# Patient Record
Sex: FEMALE | Race: WHITE | NOT HISPANIC OR LATINO | Employment: OTHER | URBAN - METROPOLITAN AREA
[De-identification: names, ages, dates, MRNs, and addresses within clinical notes are randomized per-mention and may not be internally consistent; named-entity substitution may affect disease eponyms.]

---

## 2017-01-18 ENCOUNTER — OFFICE VISIT (OUTPATIENT)
Dept: INTERNAL MEDICINE | Facility: CLINIC | Age: 77
End: 2017-01-18
Payer: MEDICARE

## 2017-01-18 VITALS
HEART RATE: 72 BPM | TEMPERATURE: 98 F | OXYGEN SATURATION: 96 % | RESPIRATION RATE: 16 BRPM | BODY MASS INDEX: 25.7 KG/M2 | DIASTOLIC BLOOD PRESSURE: 65 MMHG | HEIGHT: 63 IN | WEIGHT: 145.06 LBS | SYSTOLIC BLOOD PRESSURE: 110 MMHG

## 2017-01-18 DIAGNOSIS — Z01.818 PRE-OPERATIVE CLEARANCE: Primary | ICD-10-CM

## 2017-01-18 PROCEDURE — 99213 OFFICE O/P EST LOW 20 MIN: CPT | Mod: S$GLB,,, | Performed by: INTERNAL MEDICINE

## 2017-01-18 NOTE — MR AVS SNAPSHOT
[facility] Medicine  [address] [place] [address],  [address]  [facility] [address]  Phone: [phone]  Fax: [phone]                  Sarah Rice   2017 10:00 AM   Office Visit    Description:  Female : 1940   Provider:  Govind Jim MD   Department:  [facility] Internal Medicine           Reason for Visit     Follow-up           Diagnoses this Visit        Comments    Pre-operative clearance    -  Primary            To Do List           Your Future Surgeries/Procedures     2017   Surgery with Mark Hunter MD   [facility] (--)    [address] [facility] [address] [phone]              Goals (5 Years of Data)     None      Follow-Up and Disposition     Return in about 3 months (around 2017).    Follow-up and Disposition History      [facility]     [facility] -  Assistance  Registered nurses in the [facility] provide clinical advisement, health education, appointment booking, and other advisory services.  Call for this free service at [phone].             Medications           Message regarding Medications     Verify the changes and/or additions to your medication regime listed below are the same as discussed with your clinician today.  If any of these changes or additions are incorrect, please notify your healthcare provider.             Verify that the below list of medications is an accurate representation of the medications you are currently taking.  If none reported, the list may be blank. If incorrect, please contact your healthcare provider. Carry this list with you in case of emergency.           Current Medications     aspirin (ECOTRIN) 81 MG EC tablet Take 1 tablet (81 mg total) by mouth once daily.    atenolol (TENORMIN) 25 MG tablet Take 1 tablet (25 mg total) by mouth once daily.    diphenhydramine-acetaminophen (TYLENOL PM)  mg Tab Take 1 tablet by  "mouth every evening.    fluocinonide 0.05% (LIDEX) 0.05 % cream Apply topically 2 (two) times daily as needed.    fluticasone (FLONASE) 50 mcg/actuation nasal spray 2 sprays by Each Nare route once daily.    meloxicam (MOBIC) 7.5 MG tablet Take 1 tablet (7.5 mg total) by mouth daily as needed for Pain.    montelukast (SINGULAIR) 10 mg tablet Take 1 tablet (10 mg total) by mouth every evening.    omeprazole (PRILOSEC) 20 MG capsule Take 1 capsule (20 mg total) by mouth once daily.    paroxetine (PAXIL) 10 MG tablet Take 1 tablet (10 mg total) by mouth every morning.           Clinical Reference Information           Vital Signs - Last Recorded  Most recent update: 1/18/2017 10:17 AM by Comfort Morton MA    BP Pulse Temp Resp Ht Wt    110/65 (BP Location: Left arm, Patient Position: Sitting, BP Method: Manual) 72 97.6 °F (36.4 °C) (Oral) 16 5' 3" (1.6 m) 65.8 kg (145 lb 1 oz)    SpO2 BMI             96% 25.7 kg/m2         Blood Pressure          Most Recent Value    BP  110/65      Allergies as of 1/18/2017     Anesthetics - Amide Type    Anesthetics - Linnette Type      Immunizations Administered on Date of Encounter - 1/18/2017     None      [facility] Sign-Up     Activating your [facility] account is as easy as 1-2-3!     1) Visit [email or web address], select Sign Up Now, enter this activation code and your date of birth, then select Next.  [email or web address]  Expires: 2/6/2017 11:55 AM      2) Create a username and password to use when you visit [facility] in the future and select a security question in case you lose your password and select Next.    3) Enter your e-mail address and click Sign Up!    Additional Information  If you have questions, please e-mail [email or web address] or call [phone] to talk to our [facility] staff. Remember, [facility] is NOT to be used for urgent needs. For medical emergencies, dial 911.         "

## 2017-01-18 NOTE — PROGRESS NOTES
"Subjective:      Patient ID: Sraah Rice is a 76 y.o. female.    Chief Complaint: Follow-up    HPI: Here for medical clearance for the other eye.  Asymptomatic.  Happy about outcome from previous surgery.    Review of Systems   All other systems reviewed and are negative.      Objective:     Visit Vitals    /65 (BP Location: Left arm, Patient Position: Sitting, BP Method: Manual)    Pulse 72    Temp 97.6 °F (36.4 °C) (Oral)    Resp 16    Ht 5' 3" (1.6 m)    Wt 65.8 kg (145 lb 1 oz)    SpO2 96%    BMI 25.7 kg/m2       Physical Exam   Constitutional: She is oriented to person, place, and time. She appears well-developed and well-nourished.   HENT:   Head: Normocephalic and atraumatic.   Right Ear: External ear normal.   Left Ear: External ear normal.   Nose: Nose normal.   Mouth/Throat: Oropharynx is clear and moist.   Eyes: Conjunctivae and EOM are normal. Pupils are equal, round, and reactive to light.   Neck: Normal range of motion. Neck supple.   Cardiovascular: Normal rate, regular rhythm and normal heart sounds.    Pulmonary/Chest: Effort normal and breath sounds normal.   Abdominal: Soft. Bowel sounds are normal.   Musculoskeletal: Normal range of motion.   Neurological: She is alert and oriented to person, place, and time.   Skin: Skin is warm and dry.   Psychiatric: She has a normal mood and affect. Her behavior is normal. Judgment and thought content normal.   Nursing note and vitals reviewed.      Assessment:     1. Pre-operative clearance    Has done well.  Plan:     Pre-operative clearance    Medical cleared.  "

## 2017-01-19 PROBLEM — H25.019 CATARACT CORTICAL, SENILE: Status: ACTIVE | Noted: 2017-01-19

## 2017-01-19 PROBLEM — H25.019 CATARACT CORTICAL, SENILE: Status: RESOLVED | Noted: 2017-01-19 | Resolved: 2017-01-19

## 2017-02-21 ENCOUNTER — TELEPHONE (OUTPATIENT)
Dept: VASCULAR SURGERY | Facility: CLINIC | Age: 77
End: 2017-02-21

## 2017-02-21 DIAGNOSIS — I71.20 THORACIC AORTIC ANEURYSM WITHOUT RUPTURE: Primary | ICD-10-CM

## 2017-02-21 NOTE — TELEPHONE ENCOUNTER
----- Message from Arun Ospina sent at 2/21/2017 10:24 AM CST -----  Contact: Luz with Dr. 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 Garfield said to call pt's daughter Emelina at [phone] to schedule consult. If you have any questions please call Luz at [phone]

## 2017-02-21 NOTE — TELEPHONE ENCOUNTER
Left message for the pt daughter to call the clinic. I will schedule the appt once I confirm appt times.

## 2017-03-07 ENCOUNTER — HOSPITAL ENCOUNTER (OUTPATIENT)
Dept: RADIOLOGY | Facility: HOSPITAL | Age: 77
Discharge: HOME | End: 2017-03-07
Attending: SURGERY
Payer: MEDICARE

## 2017-03-07 ENCOUNTER — OFFICE VISIT (OUTPATIENT)
Dept: VASCULAR SURGERY | Facility: CLINIC | Age: 77
End: 2017-03-07
Payer: MEDICARE

## 2017-03-07 VITALS
TEMPERATURE: 99 F | WEIGHT: 145.31 LBS | BODY MASS INDEX: 24.81 KG/M2 | SYSTOLIC BLOOD PRESSURE: 136 MMHG | DIASTOLIC BLOOD PRESSURE: 77 MMHG | HEIGHT: 64 IN | HEART RATE: 74 BPM

## 2017-03-07 DIAGNOSIS — Z86.79 S/P AAA (ABDOMINAL AORTIC ANEURYSM) REPAIR: Primary | ICD-10-CM

## 2017-03-07 DIAGNOSIS — Z98.890 S/P AAA (ABDOMINAL AORTIC ANEURYSM) REPAIR: Primary | ICD-10-CM

## 2017-03-07 DIAGNOSIS — I71.20 THORACIC AORTIC ANEURYSM WITHOUT RUPTURE: ICD-10-CM

## 2017-03-07 LAB
CREAT SERPL-MCNC: 0.9 MG/DL (ref 0.5–1.4)
SAMPLE: NORMAL

## 2017-03-07 PROCEDURE — 99213 OFFICE O/P EST LOW 20 MIN: CPT | Mod: PBBFAC | Performed by: SURGERY

## 2017-03-07 PROCEDURE — 74174 CTA ABD&PLVS W/CONTRAST: CPT | Mod: 26,GC,, | Performed by: RADIOLOGY

## 2017-03-07 PROCEDURE — 71275 CT ANGIOGRAPHY CHEST: CPT | Mod: 26,GC,, | Performed by: RADIOLOGY

## 2017-03-07 PROCEDURE — 99999 PR PBB SHADOW E&M-EST. PATIENT-LVL III: CPT | Mod: PBBFAC,,, | Performed by: SURGERY

## 2017-03-07 PROCEDURE — 99213 OFFICE O/P EST LOW 20 MIN: CPT | Mod: S$PBB,,, | Performed by: SURGERY

## 2017-03-07 RX ORDER — ASPIRIN AND OMEPRAZOLE 325; 40 MG/1; MG/1
TABLET, FILM COATED ORAL DAILY
COMMUNITY
End: 2017-09-18

## 2017-03-07 RX ORDER — LISINOPRIL 5 MG/1
5 TABLET ORAL DAILY
COMMUNITY
End: 2017-09-18 | Stop reason: SDUPTHER

## 2017-03-07 RX ADMIN — IOHEXOL 100 ML: 350 INJECTION, SOLUTION INTRAVENOUS at 10:03

## 2017-03-07 NOTE — PROGRESS NOTES
See my prior note; review of systems, family history and social history are   unchanged.    A 76-year-old female status post:  1.  Endovascular repair, thoracic aortic aneurysm (TEVAR) 03/28/2011.  2.  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    She has been lost to follow up and has not been seen in over three years.  She   now returns.  She denies any abdominal or back pain.    Predominantly outside scan suggests an endoleak.    PHYSICAL EXAMINATION:  VITAL SIGNS:  See nursing note.  ABDOMEN:  Soft and nontender.  EXTREMITIES:  Femoral pulses are 2+.    IMAGING:  CT scan from today was personally reviewed and demonstrates:  1. Complete involution of thoracic aneurysm with no endoleak.  2. Right common iliac aneurysm sac of 3.1 compared with 3.2 from three years   ago.  I do not appreciate a clear endoleak.    Of note, the distal seal on the right common iliac is only 1 cm and very   calcific.  This was done to preserve the right hypogastric since the left was   embolized at the time of this procedure.    A 2.2 cm left common iliac aneurysm sac, which is stable from prior.    ASSESSMENT:  Five-year followup, stage TEVAR and bilateral iliac aneurysm   repair.    The right common iliac aneurysm sac is stable in size.  There is a short distal   seal zone that I disagree with decision this has a distal endoleak.  Given the   stability of the aneurysm sac, I would recommend continued observation rather   than extending this.    PLAN:  Follow up in one year with an aortic ultrasound.      JAZZ  dd: 03/07/2017 11:45:43 (CST)  td: 03/08/2017 05:31:51 (CST)  Doc ID   #[id number]  Job ID #[id number]    CC:

## 2017-03-07 NOTE — MR AVS SNAPSHOT
[facility]  [address]  [facility] [address]  Phone: [phone]  Fax: [phone]                  Sarah Rice   3/7/2017 10:30 AM   Office Visit    Description:  Female : 1940   Provider:  VAN Segal III, MD   Department:  [facility]           Reason for Visit     Follow-up           Diagnoses this Visit        Comments    S/P AAA (abdominal aortic aneurysm) repair    -  Primary            To Do List           Goals (5 Years of Data)     None      Follow-Up and Disposition     Return in about 1 year (around 3/7/2018).      [facility]     [facility] On Call Nurse Care Line -  Assistance  Registered nurses in the [facility] On Call Center provide clinical advisement, health education, appointment booking, and other advisory services.  Call for this free service at [phone].             Medications           Message regarding Medications     Verify the changes and/or additions to your medication regime listed below are the same as discussed with your clinician today.  If any of these changes or additions are incorrect, please notify your healthcare provider.             Verify that the below list of medications is an accurate representation of the medications you are currently taking.  If none reported, the list may be blank. If incorrect, please contact your healthcare provider. Carry this list with you in case of emergency.           Current Medications     aspirin (ECOTRIN) 81 MG EC tablet Take 1 tablet (81 mg total) by mouth once daily.    aspirin-omeprazole (YOSPRALA) 325-40 mg TbID Take by mouth once daily. Take one daily / once a day    atenolol (TENORMIN) 25 MG tablet Take 1 tablet (25 mg total) by mouth once daily.    diphenhydramine-acetaminophen (TYLENOL PM)  mg Tab Take 1 tablet by mouth every evening.    fluocinonide 0.05% (LIDEX) 0.05 % cream Apply topically 2 (two) times daily as needed.    fluticasone (FLONASE)  "50 mcg/actuation nasal spray 2 sprays by Each Nare route once daily.    lisinopril (PRINIVIL,ZESTRIL) 5 MG tablet Take 5 mg by mouth once daily.    meloxicam (MOBIC) 7.5 MG tablet Take 1 tablet (7.5 mg total) by mouth daily as needed for Pain.    montelukast (SINGULAIR) 10 mg tablet Take 1 tablet (10 mg total) by mouth every evening.    omeprazole (PRILOSEC) 20 MG capsule Take 1 capsule (20 mg total) by mouth once daily.    paroxetine (PAXIL) 10 MG tablet Take 1 tablet (10 mg total) by mouth every morning.           Clinical Reference Information           Your Vitals Were     BP Pulse Temp Height Weight BMI    136/77 74 98.5 °F (36.9 °C) (Oral) 5' 4" (1.626 m) 65.9 kg (145 lb 4.8 oz) 24.94 kg/m2      Blood Pressure          Most Recent Value    Right Arm BP - Sitting  136/77    Left Arm BP - Sitting  132/77    BP  136/77      Allergies as of 3/7/2017     Anesthetics - Amide Type    Anesthetics - Linnette Type      Immunizations Administered on Date of Encounter - 3/7/2017     None      Orders Placed During Today's Visit     Future Labs/Procedures Expected by Expires    VAS US Abdominal Aorta  As directed 3/7/2019      [facility] Sign-Up     Activating your [facility] account is as easy as 1-2-3!     1) Visit [email or web address], select Sign Up Now, enter this activation code and your date of birth, then select Next.  [email or web address]  Expires: 4/21/2017 11:47 AM      2) Create a username and password to use when you visit [facility] in the future and select a security question in case you lose your password and select Next.    3) Enter your e-mail address and click Sign Up!    Additional Information  If you have questions, please e-mail [email or web address] or call [phone] to talk to our [facility] staff. Remember, [facility] is NOT to be used for urgent needs. For medical emergencies, dial 911.         Language Assistance Services     ATTENTION: Language assistance services are available, free of charge. Please call " [phone].      ATENCIÓN: Si habla español, tiene a bustamante disposición servicios gratuitos de asistencia lingüística. Llame al [phone].     CHÚ Ý: N?u b?n nói Ti?ng Vi?t, có các d?ch v? h? tr? ngôn ng? mi?n phí dành cho b?n. G?i s? [phone].         [facility] complies with applicable Federal civil rights laws and does not discriminate on the basis of race, color, national origin, age, disability, or sex.

## 2017-04-06 ENCOUNTER — INITIAL CONSULT (OUTPATIENT)
Dept: PODIATRY | Facility: CLINIC | Age: 77
End: 2017-04-06
Payer: MEDICARE

## 2017-04-06 VITALS
BODY MASS INDEX: 24.41 KG/M2 | HEIGHT: 64 IN | WEIGHT: 143 LBS | DIASTOLIC BLOOD PRESSURE: 63 MMHG | HEART RATE: 63 BPM | SYSTOLIC BLOOD PRESSURE: 104 MMHG | RESPIRATION RATE: 18 BRPM

## 2017-04-06 DIAGNOSIS — L60.0 INGROWN NAIL: Primary | ICD-10-CM

## 2017-04-06 PROCEDURE — 99203 OFFICE O/P NEW LOW 30 MIN: CPT | Mod: S$GLB,,, | Performed by: PODIATRIST

## 2017-04-06 NOTE — PROGRESS NOTES
"Subjective:    Patient ID: Sarah Rice is a 76 y.o. female.    Chief Complaint: Ingrown Toenail      HPI:   Sarah is a 76 y.o. female who presents to the clinic complaining of painful ingrown toenail on the left foot.  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     Social History Main Topics    Smoking status: Former Smoker     Packs/day: 0.50     Years: 14.00     Quit date: 4/21/1999    Smokeless tobacco: Never Used    Alcohol use No    Drug use: No    Sexual activity: Not on file     Other Topics Concern    Not on file     Social History Narrative         Current Outpatient Prescriptions:     aspirin-omeprazole (YOSPRALA) 325-40 mg TbID, Take by mouth once daily. Take one daily / once a day, Disp: , Rfl:     atenolol (TENORMIN) 25 MG tablet, Take 1 tablet (25 mg total) by mouth once daily., Disp: 90 tablet, Rfl: 3    diphenhydramine-acetaminophen (TYLENOL PM)  mg Tab, Take 1 tablet by mouth every evening., Disp: , Rfl:     fluocinonide 0.05% (LIDEX) 0.05 % cream, Apply topically 2 (two) times daily as needed., Disp: 180 g, Rfl: 3    fluticasone (FLONASE) " "50 mcg/actuation nasal spray, 2 sprays by Each Nare route once daily., Disp: 3 Bottle, Rfl: 3    lisinopril (PRINIVIL,ZESTRIL) 5 MG tablet, Take 5 mg by mouth once daily., Disp: , Rfl:     meloxicam (MOBIC) 7.5 MG tablet, Take 1 tablet (7.5 mg total) by mouth daily as needed for Pain., Disp: 90 tablet, Rfl: 3    montelukast (SINGULAIR) 10 mg tablet, Take 1 tablet (10 mg total) by mouth every evening., Disp: 90 tablet, Rfl: 3    omeprazole (PRILOSEC) 20 MG capsule, Take 1 capsule (20 mg total) by mouth once daily., Disp: 90 capsule, Rfl: 3    paroxetine (PAXIL) 10 MG tablet, Take 1 tablet (10 mg total) by mouth every morning., Disp: 90 tablet, Rfl: 3    aspirin (ECOTRIN) 81 MG EC tablet, Take 1 tablet (81 mg total) by mouth once daily. (Patient taking differently: Take 81 mg by mouth once daily. Take every now and then), Disp: 100 tablet, Rfl: 3  Review of patient's allergies indicates:   Allergen Reactions    Anesthetics - amide type      Patient unsure of med - unable to be awakened.    Patient has tolerated topical local anesthetic for cataract procedure without issues    Anesthetics - jordan type      Patient unsure of med - unable to be awakened.    Patient has tolerated topical local anesthetic for cataract procedure without issues       /63 (BP Location: Left arm, Patient Position: Sitting, BP Method: Automatic)  Pulse 63  Resp 18  Ht 5' 4" (1.626 m)  Wt 64.9 kg (143 lb)  BMI 24.55 kg/m2    ROS  ROS Constitutional:  General Appearance: well nourished  Vascular: negative for cramps, edema and bruising  Musculoskeletal: negative for joint paint and joint edema  Skin: negative for rashes and lesions  Neurological: negative for burning, tingling and numbness  Gastrointestinal: negative for stomach pain, nausea and vomiting        Objective:        Ortho/SPM Exam  Physical Exam  LE exam con't:  V: DP 2/4, PT 2/4, CRT< 3s to all digits tested.     N: SILT in SP/DP/T/Chucho/Saph distributions. "     Derm: Skin intact. No erythema, edema or ecchymosis. L 3rd toenail mildly ingrowning. No signs of infection    Ortho:  +Motor EHL/FHL/TA/GA   Compartments soft/compressible. No pain on passive stretch of big toe. No calf  pain.        Assessment:     Imaging / Labs:    No results found.            1. Ingrown nail - Left Foot          Plan:            Sarah was seen today for ingrown toenail.    Diagnoses and all orders for this visit:    Ingrown nail - Left Foot        Sarah Rice is a 76 y.o. female presenting w/   1. Ingrown nail - Left Foot          --pt seen, evaluated, and managed  -dx discussed in detail. All questions/concerns addressed  -all tx options discussed. All alternatives, risks, benefits of all txs discussed  -The patient was educated regarding the above diagnosis. We discussed conservative care options regarding shoe wear and/or padding.  -rxs dispensed: none  -discussed ingrowing toenails and all tx options. Pt opts for non-procedural slantback which was performed as a courtesy w/o complication  -pt to perform epsom salt or betadine soaks once daily x 2wks. Written instructions dispensed  -keep toe covered with triple abx ointment + bandaid x 2wks  -will plan for procedural removal at nxt visit or if ingrown nails recur    rtc 4 wks for possible procedure: PNA w/o matrixectomy b/l hallux medial border      Return in about 4 weeks (around 5/4/2017) for procedure: ingrown toenail removal.

## 2017-04-06 NOTE — PATIENT INSTRUCTIONS
Instructions for Care after Ingrown Nail removal      Dressing Options- Traditional Method:  1. Soaking two times a day in WARM water with Epsom salts or diluted Povidone Iodine  (Betadine) for 15-20 minutes. You will need to purchase these products from the pharmacy.  2. Dry toe then apply an antibiotic cream or ointment such as Neosporin or Polysporin plus or  Garamycin and cover with a 2 x 2 inch size gauze and then secure with a 1 inch band aid.  3. In the second week, take the dressing off at bedtime to air dry the toe.          Understanding Ingrown Toenails    An ingrown nail is the result of a nail growing into the skin that surrounds it. This often occurs at either edge of the big toe. Ingrown nails may be caused by improper trimming, inherited nail deformities, injuries, fungal infections, or pressure.  Symptoms  Ingrown nails may cause pain at the tip of the toe or all the way to the base of the toe. The pain is often worse while walking. An ingrown nail may also lead to infection, inflammation, or a more serious condition. If its infected, you might see pus or redness.  Evaluation  To determine the extent of your problem, your healthcare provider examines and possibly presses the painful area. If other problems are suspected, blood tests, cultures, and X-rays may be done as well.  Treatment  If the nail isnt infected, your healthcare provider may trim the corner of it to help relieve your symptoms. He or she may need to remove one side of your nail back to the cuticle. The base of the nail may then be treated with a chemical to keep the ingrown part from growing back. Severe infections or ingrown nails may require antibiotics and temporary or permanent removal of a portion of the nail. To prevent pain, a local anesthetic may be used in these procedures. This treatment is usually done at your healthcare provider's office.  Prevention  Many nail problems can be prevented by wearing the right shoes and  trimming your nails properly. To help avoid infection, keep your feet clean and dry. If you have diabetes, talk with your healthcare provider before doing any foot self-care.  · The right shoes: Get your feet measured (your size may change as you age). Wear shoes that are supportive and roomy enough for your toes to wiggle. Look for shoes made of natural materials such as leather, which allow your feet to breathe.  · Proper trimming: To avoid problems, trim your toenails straight across without cutting down into the corners. If you cant trim your own nails, ask your healthcare provider to do so for you.  Date Last Reviewed: 10/1/2016  © 5964-0477 [employer]. [address] [place] [address], [place] [address]. All rights reserved. This information is not intended as a substitute for professional medical care. Always follow your healthcare professional's instructions.

## 2017-04-06 NOTE — MR AVS SNAPSHOT
[facility]  [address], [address]   [place] [address]  Phone: [phone]  Fax: [phone]                  Sarah Rice   2017 1:45 PM   Initial consult    Description:  Female : 1940   Provider:  Vincent Richards Jr., DPM   Department:  [facility]           Reason for Visit     Ingrown Toenail           Diagnoses this Visit        Comments    Ingrown nail    -  Primary            To Do List           Future Appointments        Provider Department Dept Phone    2017 10:45 AM Vincent Richards Jr., DPM [facility] [phone]      Goals (5 Years of Data)     None      Follow-Up and Disposition     Return in about 4 weeks (around 2017) for procedure: ingrown toenail removal.      [facility]     [facility] On Call Nurse Care Line -  Assistance  Unless otherwise directed by your provider, please contact [facility], our nurse care line that is available for  assistance.     Registered nurses in the [facility] On Call Center provide: appointment scheduling, clinical advisement, health education, and other advisory services.  Call: [phone] (toll free)               Medications           Message regarding Medications     Verify the changes and/or additions to your medication regime listed below are the same as discussed with your clinician today.  If any of these changes or additions are incorrect, please notify your healthcare provider.             Verify that the below list of medications is an accurate representation of the medications you are currently taking.  If none reported, the list may be blank. If incorrect, please contact your healthcare provider. Carry this list with you in case of emergency.           Current Medications     aspirin-omeprazole (YOSPRALA) 325-40 mg TbID Take by mouth once daily. Take one daily / once a day    atenolol (TENORMIN) 25 MG tablet Take 1 tablet (25 mg total) by  "mouth once daily.    diphenhydramine-acetaminophen (TYLENOL PM)  mg Tab Take 1 tablet by mouth every evening.    fluocinonide 0.05% (LIDEX) 0.05 % cream Apply topically 2 (two) times daily as needed.    fluticasone (FLONASE) 50 mcg/actuation nasal spray 2 sprays by Each Nare route once daily.    lisinopril (PRINIVIL,ZESTRIL) 5 MG tablet Take 5 mg by mouth once daily.    meloxicam (MOBIC) 7.5 MG tablet Take 1 tablet (7.5 mg total) by mouth daily as needed for Pain.    montelukast (SINGULAIR) 10 mg tablet Take 1 tablet (10 mg total) by mouth every evening.    omeprazole (PRILOSEC) 20 MG capsule Take 1 capsule (20 mg total) by mouth once daily.    paroxetine (PAXIL) 10 MG tablet Take 1 tablet (10 mg total) by mouth every morning.    aspirin (ECOTRIN) 81 MG EC tablet Take 1 tablet (81 mg total) by mouth once daily.           Clinical Reference Information           Your Vitals Were     BP Pulse Resp Height Weight BMI    104/63 (BP Location: Left arm, Patient Position: Sitting, BP Method: Automatic) 63 18 5' 4" (1.626 m) 64.9 kg (143 lb) 24.55 kg/m2      Blood Pressure          Most Recent Value    BP  104/63      Allergies as of 4/6/2017     Anesthetics - Amide Type    Anesthetics - Linnette Type      Immunizations Administered on Date of Encounter - 4/6/2017     None      [facility] Sign-Up     Activating your [facility] account is as easy as 1-2-3!     1) Visit [email or web address], select Sign Up Now, enter this activation code and your date of birth, then select Next.  [email or web address]  Expires: 4/21/2017 12:47 PM      2) Create a username and password to use when you visit [facility] in the future and select a security question in case you lose your password and select Next.    3) Enter your e-mail address and click Sign Up!    Additional Information  If you have questions, please e-mail [email or web address] or call [phone] to talk to our [facility] staff. Remember, [facility] is NOT to be used for urgent needs. For " medical emergencies, dial 911.         Instructions      Instructions for Care after Ingrown Nail removal      Dressing Options- Traditional Method:  1. Soaking two times a day in WARM water with Epsom salts or diluted Povidone Iodine  (Betadine) for 15-20 minutes. You will need to purchase these products from the pharmacy.  2. Dry toe then apply an antibiotic cream or ointment such as Neosporin or Polysporin plus or  Garamycin and cover with a 2 x 2 inch size gauze and then secure with a 1 inch band aid.  3. In the second week, take the dressing off at bedtime to air dry the toe.          Understanding Ingrown Toenails    An ingrown nail is the result of a nail growing into the skin that surrounds it. This often occurs at either edge of the big toe. Ingrown nails may be caused by improper trimming, inherited nail deformities, injuries, fungal infections, or pressure.  Symptoms  Ingrown nails may cause pain at the tip of the toe or all the way to the base of the toe. The pain is often worse while walking. An ingrown nail may also lead to infection, inflammation, or a more serious condition. If its infected, you might see pus or redness.  Evaluation  To determine the extent of your problem, your healthcare provider examines and possibly presses the painful area. If other problems are suspected, blood tests, cultures, and X-rays may be done as well.  Treatment  If the nail isnt infected, your healthcare provider may trim the corner of it to help relieve your symptoms. He or she may need to remove one side of your nail back to the cuticle. The base of the nail may then be treated with a chemical to keep the ingrown part from growing back. Severe infections or ingrown nails may require antibiotics and temporary or permanent removal of a portion of the nail. To prevent pain, a local anesthetic may be used in these procedures. This treatment is usually done at your healthcare provider's office.  Prevention  Many nail  problems can be prevented by wearing the right shoes and trimming your nails properly. To help avoid infection, keep your feet clean and dry. If you have diabetes, talk with your healthcare provider before doing any foot self-care.  · The right shoes: Get your feet measured (your size may change as you age). Wear shoes that are supportive and roomy enough for your toes to wiggle. Look for shoes made of natural materials such as leather, which allow your feet to breathe.  · Proper trimming: To avoid problems, trim your toenails straight across without cutting down into the corners. If you cant trim your own nails, ask your healthcare provider to do so for you.  Date Last Reviewed: 10/1/2016  © 9600-4356 [employer]. [address] [place] [address], [place] [address]. All rights reserved. This information is not intended as a substitute for professional medical care. Always follow your healthcare professional's instructions.             Language Assistance Services     ATTENTION: Language assistance services are available, free of charge. Please call [phone].      ATENCIÓN: Si habla español, tiene a bustamante disposición servicios gratuitos de asistencia lingüística. Llame al [phone].     [facility] Ý: N?u b?n nói Ti?ng Vi?t, có các d?ch v? h? tr? ngôn ng? mi?n phí dành cho b?n. G?i s? [phone].         [facility] complies with applicable Federal civil rights laws and does not discriminate on the basis of race, color, national origin, age, disability, or sex.

## 2017-04-06 NOTE — LETTER
April 10, 2017      Alfonso Conner MD  [address]  [address]  [facility] [address]           [facility]  [address], [address]   [facility] [address]  Phone: [phone]  Fax: [phone]          Patient: Sarah Rice   MR Number: [id number]   YOB: 1940   Date of Visit: 4/6/2017       Dear Dr. Alfonso Conner:    Thank you for referring Sarah Rice to me for evaluation. Attached you will find relevant portions of my assessment and plan of care.    If you have questions, please do not hesitate to call me. I look forward to following Sarah Rice along with you.    Sincerely,    Vincent Richards Jr., DPM    Enclosure  CC:  No Recipients    If you would like to receive this communication electronically, please contact [email or web address] or [phone] to request more information on [employer] Link access.    For providers and/or their staff who would like to refer a patient to [facility], please contact us through our one-stop-shop provider referral line, [facility] [place], at [phone].    If you feel you have received this communication in error or would no longer like to receive these types of communications, please e-mail [email or web address]

## 2017-05-09 ENCOUNTER — OFFICE VISIT (OUTPATIENT)
Dept: PODIATRY | Facility: CLINIC | Age: 77
End: 2017-05-09
Payer: MEDICARE

## 2017-05-09 VITALS
DIASTOLIC BLOOD PRESSURE: 71 MMHG | HEART RATE: 73 BPM | RESPIRATION RATE: 18 BRPM | BODY MASS INDEX: 23.9 KG/M2 | WEIGHT: 140 LBS | HEIGHT: 64 IN | SYSTOLIC BLOOD PRESSURE: 106 MMHG

## 2017-05-09 DIAGNOSIS — L60.0 INGROWN NAIL: Primary | ICD-10-CM

## 2017-05-09 PROCEDURE — 99213 OFFICE O/P EST LOW 20 MIN: CPT | Mod: S$GLB,,, | Performed by: PODIATRIST

## 2017-05-09 NOTE — MR AVS SNAPSHOT
[facility]  [address], [address]   [place] [address]  Phone: [phone]  Fax: [phone]                  Sarah Rice   2017 10:45 AM   Office Visit    Description:  Female : 1940   Provider:  Vincent Richards Jr., DPM   Department:  [facility]           Reason for Visit     Follow-up           Diagnoses this Visit        Comments    Ingrown nail    -  Primary            To Do List           Goals (5 Years of Data)     None      Follow-Up and Disposition     Return if symptoms worsen or fail to improve.    Follow-up and Disposition History      [facility]     [facility] On Call Nurse Care Line -  Assistance  Unless otherwise directed by your provider, please contact [facility], our nurse care line that is available for  assistance.     Registered nurses in the [facility] provide: appointment scheduling, clinical advisement, health education, and other advisory services.  Call: [phone] (toll free)               Medications           Message regarding Medications     Verify the changes and/or additions to your medication regime listed below are the same as discussed with your clinician today.  If any of these changes or additions are incorrect, please notify your healthcare provider.        STOP taking these medications     aspirin (ECOTRIN) 81 MG EC tablet Take 1 tablet (81 mg total) by mouth once daily.           Verify that the below list of medications is an accurate representation of the medications you are currently taking.  If none reported, the list may be blank. If incorrect, please contact your healthcare provider. Carry this list with you in case of emergency.           Current Medications     aspirin-omeprazole (YOSPRALA) 325-40 mg TbID Take by mouth once daily. Take one daily / once a day    atenolol (TENORMIN) 25 MG tablet Take 1 tablet (25 mg total) by mouth once daily.     "diphenhydramine-acetaminophen (TYLENOL PM)  mg Tab Take 1 tablet by mouth every evening.    fluocinonide 0.05% (LIDEX) 0.05 % cream Apply topically 2 (two) times daily as needed.    fluticasone (FLONASE) 50 mcg/actuation nasal spray 2 sprays by Each Nare route once daily.    lisinopril (PRINIVIL,ZESTRIL) 5 MG tablet Take 5 mg by mouth once daily.    meloxicam (MOBIC) 7.5 MG tablet Take 1 tablet (7.5 mg total) by mouth daily as needed for Pain.    montelukast (SINGULAIR) 10 mg tablet Take 1 tablet (10 mg total) by mouth every evening.    omeprazole (PRILOSEC) 20 MG capsule Take 1 capsule (20 mg total) by mouth once daily.    paroxetine (PAXIL) 10 MG tablet Take 1 tablet (10 mg total) by mouth every morning.           Clinical Reference Information           Your Vitals Were     BP Pulse Resp Height Weight BMI    106/71 (BP Location: Right arm, Patient Position: Sitting, BP Method: Automatic) 73 18 5' 4" (1.626 m) 63.5 kg (140 lb) 24.03 kg/m2      Blood Pressure          Most Recent Value    BP  106/71      Allergies as of 5/9/2017     Anesthetics - Amide Type    Anesthetics - Linnette Type- Parabens      Immunizations Administered on Date of Encounter - 5/9/2017     None      [facility] Sign-Up     Activating your [facility] account is as easy as 1-2-3!     1) Visit [email or web address], select Sign Up Now, enter this activation code and your date of birth, then select Next.  [email or web address]  Expires: 6/23/2017 11:06 AM      2) Create a username and password to use when you visit [facility] in the future and select a security question in case you lose your password and select Next.    3) Enter your e-mail address and click Sign Up!    Additional Information  If you have questions, please e-mail [email or web address] or call [phone] to talk to our [facility] staff. Remember, [facility] is NOT to be used for urgent needs. For medical emergencies, dial 911.         Instructions      Understanding Ingrown Toenails    An " ingrown nail is the result of a nail growing into the skin that surrounds it. This often occurs at either edge of the big toe. Ingrown nails may be caused by improper trimming, inherited nail deformities, injuries, fungal infections, or pressure.  Symptoms  Ingrown nails may cause pain at the tip of the toe or all the way to the base of the toe. The pain is often worse while walking. An ingrown nail may also lead to infection, inflammation, or a more serious condition. If its infected, you might see pus or redness.  Evaluation  To determine the extent of your problem, your healthcare provider examines and possibly presses the painful area. If other problems are suspected, blood tests, cultures, and X-rays may be done as well.  Treatment  If the nail isnt infected, your healthcare provider may trim the corner of it to help relieve your symptoms. He or she may need to remove one side of your nail back to the cuticle. The base of the nail may then be treated with a chemical to keep the ingrown part from growing back. Severe infections or ingrown nails may require antibiotics and temporary or permanent removal of a portion of the nail. To prevent pain, a local anesthetic may be used in these procedures. This treatment is usually done at your healthcare provider's office.  Prevention  Many nail problems can be prevented by wearing the right shoes and trimming your nails properly. To help avoid infection, keep your feet clean and dry. If you have diabetes, talk with your healthcare provider before doing any foot self-care.  · The right shoes: Get your feet measured (your size may change as you age). Wear shoes that are supportive and roomy enough for your toes to wiggle. Look for shoes made of natural materials such as leather, which allow your feet to breathe.  · Proper trimming: To avoid problems, trim your toenails straight across without cutting down into the corners. If you cant trim your own nails, ask your  healthcare provider to do so for you.  Date Last Reviewed: 10/1/2016  © 7841-8946 [employer]. [address] [place] [address], [place] [address]. All rights reserved. This information is not intended as a substitute for professional medical care. Always follow your healthcare professional's instructions.             Language Assistance Services     ATTENTION: Language assistance services are available, free of charge. Please call [phone].      ATENCIÓN: Si habla español, tiene a bustamante disposición servicios gratuitos de asistencia lingüística. Llame al [phone].     [facility] Ý: N?u b?n nói Ti?ng Vi?t, có các d?ch v? h? tr? ngôn ng? mi?n phí dành cho b?n. G?i s? [phone].         [facility] complies with applicable Federal civil rights laws and does not discriminate on the basis of race, color, national origin, age, disability, or sex.

## 2017-05-09 NOTE — PATIENT INSTRUCTIONS

## 2017-05-09 NOTE — PROGRESS NOTES
"Subjective:    Patient ID: Sarah Rice is a 76 y.o. female.    Chief Complaint: Follow-up (post toenail removal)      HPI:   Sarah is a 76 y.o. female who presents to the clinic complaining of painful ingrown toenail on the left foot.  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     Social History Main Topics    Smoking status: Former Smoker     Packs/day: 0.50     Years: 14.00     Quit date: 4/21/1999    Smokeless tobacco: Never Used    Alcohol use No    Drug use: No    Sexual activity: Not on file     Other Topics Concern    Not on file     Social History Narrative         Current Outpatient Prescriptions:     aspirin-omeprazole (YOSPRALA) 325-40 mg TbID, Take by mouth once daily. Take one daily / once a day, Disp: , Rfl:     atenolol (TENORMIN) 25 MG tablet, Take 1 tablet (25 mg total) by mouth once daily., Disp: 90 tablet, Rfl: 3    diphenhydramine-acetaminophen (TYLENOL PM)  mg Tab, Take 1 tablet by mouth every evening., Disp: , Rfl:     fluocinonide 0.05% (LIDEX) 0.05 % cream, Apply topically 2 (two) times daily as needed., Disp: 180 g, Rfl: 3    " "fluticasone (FLONASE) 50 mcg/actuation nasal spray, 2 sprays by Each Nare route once daily., Disp: 3 Bottle, Rfl: 3    lisinopril (PRINIVIL,ZESTRIL) 5 MG tablet, Take 5 mg by mouth once daily., Disp: , Rfl:     meloxicam (MOBIC) 7.5 MG tablet, Take 1 tablet (7.5 mg total) by mouth daily as needed for Pain., Disp: 90 tablet, Rfl: 3    montelukast (SINGULAIR) 10 mg tablet, Take 1 tablet (10 mg total) by mouth every evening., Disp: 90 tablet, Rfl: 3    omeprazole (PRILOSEC) 20 MG capsule, Take 1 capsule (20 mg total) by mouth once daily., Disp: 90 capsule, Rfl: 3    paroxetine (PAXIL) 10 MG tablet, Take 1 tablet (10 mg total) by mouth every morning., Disp: 90 tablet, Rfl: 3  Review of patient's allergies indicates:   Allergen Reactions    Anesthetics - amide type      Patient unsure of med - unable to be awakened.    Patient has tolerated topical local anesthetic for cataract procedure without issues    Anesthetics - jordan type      Patient unsure of med - unable to be awakened.    Patient has tolerated topical local anesthetic for cataract procedure without issues       /71 (BP Location: Right arm, Patient Position: Sitting, BP Method: Automatic)  Pulse 73  Resp 18  Ht 5' 4" (1.626 m)  Wt 63.5 kg (140 lb)  BMI 24.03 kg/m2    ROS  ROS Constitutional:  General Appearance: well nourished  Vascular: negative for cramps, edema and bruising  Musculoskeletal: negative for joint paint and joint edema  Skin: negative for rashes and lesions  Neurological: negative for burning, tingling and numbness  Gastrointestinal: negative for stomach pain, nausea and vomiting        Objective:        Ortho/SPM Exam  Physical Exam  LE exam con't:  V: DP 2/4, PT 2/4, CRT< 3s to all digits tested.     N: SILT in SP/DP/T/Chucho/Saph distributions.     Derm: Skin intact. No erythema, edema or ecchymosis. L 3rd toenail w/o signs of infection or ingrowing nail.     Ortho:  +Motor EHL/FHL/TA/GA   Compartments soft/compressible. No " pain on passive stretch of big toe. No calf  pain.        Assessment:     Imaging / Labs:    No results found.            1. Ingrown nail          Plan:            Sarah was seen today for follow-up.    Diagnoses and all orders for this visit:    Ingrown nail        Sarah Rice is a 76 y.o. female presenting w/   1. Ingrown nail          --pt seen, evaluated, and managed  -dx discussed in detail. All questions/concerns addressed  -all tx options discussed. All alternatives, risks, benefits of all txs discussed  -The patient was educated regarding the above diagnosis. We discussed conservative care options regarding shoe wear and/or padding.  -rxs dispensed: none  -pt has healed well after slantback  -will plan for procedural removal at nxt visit or if ingrown nails recur      Return if symptoms worsen or fail to improve.

## 2017-05-09 NOTE — LETTER
May 9, 2017      Alfonso Conner MD  [address]  [address]  [facility] [address]           [facility]  [address], [address]   [facility] [address]  Phone: [phone]  Fax: [phone]          Patient: Sarah Rice   MR Number: [id number]   YOB: 1940   Date of Visit: 5/9/2017       Dear Dr. Alfonso Conner:    Thank you for referring Sarah Rice to me for evaluation. Attached you will find relevant portions of my assessment and plan of care.    If you have questions, please do not hesitate to call me. I look forward to following Sarah Rice along with you.    Sincerely,    Vincent Richards Jr., DPM    Enclosure  CC:  No Recipients    If you would like to receive this communication electronically, please contact [email or web address] or [phone] to request more information on [employer] Link access.    For providers and/or their staff who would like to refer a patient to [facility], please contact us through our one-stop-shop provider referral line, [facility] [place], at [phone].    If you feel you have received this communication in error or would no longer like to receive these types of communications, please e-mail [email or web address]

## 2017-09-12 ENCOUNTER — TELEPHONE (OUTPATIENT)
Dept: INTERNAL MEDICINE | Facility: CLINIC | Age: 77
End: 2017-09-12

## 2017-09-12 DIAGNOSIS — E78.5 HYPERLIPIDEMIA, UNSPECIFIED HYPERLIPIDEMIA TYPE: ICD-10-CM

## 2017-09-12 DIAGNOSIS — I10 ESSENTIAL HYPERTENSION: Primary | ICD-10-CM

## 2017-09-12 NOTE — TELEPHONE ENCOUNTER
----- Message from Sumaya Heath sent at 9/12/2017  4:26 PM CDT -----  Contact: patient   Patient wants to schedule a appointment with us, but would like to know if she should do labs first, if so she needs orders.

## 2017-09-18 ENCOUNTER — OFFICE VISIT (OUTPATIENT)
Dept: INTERNAL MEDICINE | Facility: CLINIC | Age: 77
End: 2017-09-18
Payer: MEDICARE

## 2017-09-18 VITALS
DIASTOLIC BLOOD PRESSURE: 74 MMHG | HEART RATE: 87 BPM | OXYGEN SATURATION: 97 % | WEIGHT: 145.81 LBS | BODY MASS INDEX: 24.89 KG/M2 | SYSTOLIC BLOOD PRESSURE: 110 MMHG | RESPIRATION RATE: 16 BRPM | HEIGHT: 64 IN | TEMPERATURE: 98 F

## 2017-09-18 DIAGNOSIS — J67.9: ICD-10-CM

## 2017-09-18 DIAGNOSIS — F32.A DEPRESSION, UNSPECIFIED DEPRESSION TYPE: ICD-10-CM

## 2017-09-18 DIAGNOSIS — K21.9 GASTROESOPHAGEAL REFLUX DISEASE, ESOPHAGITIS PRESENCE NOT SPECIFIED: ICD-10-CM

## 2017-09-18 DIAGNOSIS — Z86.79 HISTORY OF ATRIAL FIBRILLATION: ICD-10-CM

## 2017-09-18 DIAGNOSIS — R46.89 OUTBURSTS OF EXPLOSIVE BEHAVIOR: ICD-10-CM

## 2017-09-18 DIAGNOSIS — Z78.0 ASYMPTOMATIC POSTMENOPAUSAL STATUS (AGE-RELATED) (NATURAL): ICD-10-CM

## 2017-09-18 DIAGNOSIS — I10 ESSENTIAL HYPERTENSION: ICD-10-CM

## 2017-09-18 DIAGNOSIS — R21 RASH: ICD-10-CM

## 2017-09-18 DIAGNOSIS — M19.90 ARTHRITIS: ICD-10-CM

## 2017-09-18 DIAGNOSIS — Z23 NEED FOR PNEUMOCOCCAL VACCINATION: Primary | ICD-10-CM

## 2017-09-18 PROCEDURE — G0008 ADMIN INFLUENZA VIRUS VAC: HCPCS | Mod: S$GLB,,, | Performed by: INTERNAL MEDICINE

## 2017-09-18 PROCEDURE — 1159F MED LIST DOCD IN RCRD: CPT | Mod: S$GLB,,, | Performed by: INTERNAL MEDICINE

## 2017-09-18 PROCEDURE — 90662 IIV NO PRSV INCREASED AG IM: CPT | Mod: S$GLB,,, | Performed by: INTERNAL MEDICINE

## 2017-09-18 PROCEDURE — 1126F AMNT PAIN NOTED NONE PRSNT: CPT | Mod: S$GLB,,, | Performed by: INTERNAL MEDICINE

## 2017-09-18 PROCEDURE — 99214 OFFICE O/P EST MOD 30 MIN: CPT | Mod: 25,S$GLB,, | Performed by: INTERNAL MEDICINE

## 2017-09-18 PROCEDURE — 90732 PPSV23 VACC 2 YRS+ SUBQ/IM: CPT | Mod: S$GLB,,, | Performed by: INTERNAL MEDICINE

## 2017-09-18 PROCEDURE — 3078F DIAST BP <80 MM HG: CPT | Mod: S$GLB,,, | Performed by: INTERNAL MEDICINE

## 2017-09-18 PROCEDURE — 3074F SYST BP LT 130 MM HG: CPT | Mod: S$GLB,,, | Performed by: INTERNAL MEDICINE

## 2017-09-18 PROCEDURE — G0009 ADMIN PNEUMOCOCCAL VACCINE: HCPCS | Mod: S$GLB,,, | Performed by: INTERNAL MEDICINE

## 2017-09-18 RX ORDER — MONTELUKAST SODIUM 10 MG/1
10 TABLET ORAL NIGHTLY
Qty: 90 TABLET | Refills: 3 | Status: SHIPPED | OUTPATIENT
Start: 2017-09-18 | End: 2018-09-25 | Stop reason: SDUPTHER

## 2017-09-18 RX ORDER — ATENOLOL 25 MG/1
25 TABLET ORAL DAILY
Qty: 90 TABLET | Refills: 3 | Status: SHIPPED | OUTPATIENT
Start: 2017-09-18 | End: 2018-09-25 | Stop reason: SDUPTHER

## 2017-09-18 RX ORDER — ATORVASTATIN CALCIUM 20 MG/1
20 TABLET, FILM COATED ORAL DAILY
COMMUNITY
End: 2018-09-25 | Stop reason: SDUPTHER

## 2017-09-18 RX ORDER — OMEPRAZOLE 20 MG/1
20 CAPSULE, DELAYED RELEASE ORAL DAILY
Qty: 90 CAPSULE | Refills: 3 | Status: SHIPPED | OUTPATIENT
Start: 2017-09-18 | End: 2018-09-25 | Stop reason: SDUPTHER

## 2017-09-18 RX ORDER — MELOXICAM 7.5 MG/1
7.5 TABLET ORAL DAILY PRN
Qty: 90 TABLET | Refills: 3 | Status: SHIPPED | OUTPATIENT
Start: 2017-09-18 | End: 2018-10-25 | Stop reason: SDUPTHER

## 2017-09-18 RX ORDER — PAROXETINE 10 MG/1
10 TABLET, FILM COATED ORAL EVERY MORNING
Qty: 90 TABLET | Refills: 3 | Status: SHIPPED | OUTPATIENT
Start: 2017-09-18 | End: 2018-09-25 | Stop reason: SDUPTHER

## 2017-09-18 RX ORDER — FLUTICASONE PROPIONATE 50 MCG
2 SPRAY, SUSPENSION (ML) NASAL DAILY
Qty: 3 BOTTLE | Refills: 3 | Status: SHIPPED | OUTPATIENT
Start: 2017-09-18 | End: 2019-06-28 | Stop reason: SDUPTHER

## 2017-09-18 RX ORDER — LISINOPRIL 5 MG/1
5 TABLET ORAL DAILY
Qty: 90 TABLET | Refills: 3 | Status: SHIPPED | OUTPATIENT
Start: 2017-09-18 | End: 2018-09-25 | Stop reason: SDUPTHER

## 2017-09-18 RX ORDER — FLUOCINONIDE 0.5 MG/G
CREAM TOPICAL 2 TIMES DAILY PRN
Qty: 180 G | Refills: 3 | Status: SHIPPED | OUTPATIENT
Start: 2017-09-18 | End: 2019-06-28 | Stop reason: SDUPTHER

## 2017-09-18 NOTE — PROGRESS NOTES
Subjective:      Patient ID: Sarah Rice is a 76 y.o. female.    Chief Complaint: Results (lab results); Medication Refill; and Leg Pain (left leg)    HPI: 76 y.o. White female , had her cataract surgery in January.  However, before that, was found by anesthesia to be in at fib, so she was  Sent to Dr. Conner, for new onset atrial fib.  This was controlled, so he put her on Xarelto, which she has tolerated without mishap.  Doing well otherwise.   Reviewed labs with pt/daughter:    Recent Labs  Lab Result Units 09/14/17  0903   HDL mg/dL 45   Cholesterol mg/dL 123   Triglycerides mg/dL 77   HDL/Chol Ratio % 36.6   Non-HDL Cholesterol mg/dL 78       Recent Labs  Lab Result Units 09/14/17  0903   Total Cholesterol/HDL Ratio  2.7     No results for input(s): KETONESU, OCCULTUA, NITRITE, UROBILINOGEN in the last 2160 hours.    Recent Labs  Lab Result Units 09/14/17  0903   WBC K/uL 6.37   RBC M/uL 4.24   Hemoglobin g/dL 12.7   Hematocrit % 40.5   MCH pg 30.0   RDW % 12.7       Recent Labs  Lab Result Units 09/14/17  0903   Platelets K/uL 217   MPV fL 10.8   Glucose mg/dL 87   BUN, Bld mg/dL 10   Creatinine mg/dL 0.99   Calcium mg/dL 9.7   Sodium mmol/L 145       Recent Labs  Lab Result Units 09/14/17  0903   Potassium mmol/L 4.4   Chloride mmol/L 104   Total Protein g/dL 7.5   Albumin g/dL 4.2   Total Bilirubin mg/dL 1.3*   AST U/L 26   Alkaline Phosphatase U/L 92       Recent Labs  Lab Result Units 09/14/17  0903   CO2 mmol/L 29   ALT U/L 27   Anion Gap mmol/L 12   eGFR if non African American mL/min/1.73 m^2 54.8*   MCV fL 96         Review of Systems   Constitutional: Positive for activity change. Negative for diaphoresis, fatigue and unexpected weight change.        Admits to not using her treadmill anymore, because she likes to sleep in.   HENT: Negative.    Eyes: Negative.    Respiratory: Negative for cough, choking, chest tightness, shortness of breath and wheezing.    Cardiovascular: Negative for chest  "pain, palpitations and leg swelling.   Gastrointestinal: Positive for constipation. Negative for abdominal distention, abdominal pain, anal bleeding, blood in stool, diarrhea, nausea and vomiting.   Endocrine: Negative.    Genitourinary: Negative.    Musculoskeletal: Negative.    Skin: Negative.    Neurological: Negative.  Negative for weakness.   Hematological: Does not bruise/bleed easily.   Psychiatric/Behavioral: Negative.        Objective:   /74 (BP Location: Left arm, Patient Position: Sitting, BP Method: Large (Manual))   Pulse 87   Temp 97.6 °F (36.4 °C) (Oral)   Resp 16   Ht 5' 4" (1.626 m)   Wt 66.2 kg (145 lb 13.4 oz)   SpO2 97%   BMI 25.03 kg/m²     Physical Exam   Constitutional: She is oriented to person, place, and time. She appears well-developed and well-nourished.   HENT:   Head: Normocephalic.   Right Ear: External ear normal.   Left Ear: External ear normal.   Nose: Nose normal.   Mouth/Throat: Oropharynx is clear and moist.   Eyes: Conjunctivae and EOM are normal. Pupils are equal, round, and reactive to light.   Neck: Normal range of motion.   Cardiovascular: Normal rate, regular rhythm and normal heart sounds.    Pulmonary/Chest: Effort normal and breath sounds normal.   Abdominal: Soft. Bowel sounds are normal.   Musculoskeletal: Normal range of motion. She exhibits no edema.   Neurological: She is alert and oriented to person, place, and time.   Skin: Skin is warm and dry.   Psychiatric: She has a normal mood and affect. Her behavior is normal.   Nursing note and vitals reviewed.      Assessment:     1. Need for pneumococcal vaccination    2. Asymptomatic postmenopausal status (age-related) (natural)    3. Depression, unspecified depression type    4. Outbursts of explosive behavior    5. Gastroesophageal reflux disease, esophagitis presence not specified    6. Arthritis    7. Essential hypertension    8. Rash    9. Allergic alveolitis    10. History of atrial fibrillation  "     Plan:     Need for pneumococcal vaccination  -     Pneumococcal Polysaccharide Vaccine (23 Valent) (SQ/IM)    Asymptomatic postmenopausal status (age-related) (natural)  -     DXA Bone Density Spine And Hip; Future; Expected date: 09/18/2017    Depression, unspecified depression type  -     paroxetine (PAXIL) 10 MG tablet; Take 1 tablet (10 mg total) by mouth every morning.  Dispense: 90 tablet; Refill: 3    Outbursts of explosive behavior  -     paroxetine (PAXIL) 10 MG tablet; Take 1 tablet (10 mg total) by mouth every morning.  Dispense: 90 tablet; Refill: 3    Gastroesophageal reflux disease, esophagitis presence not specified  -     omeprazole (PRILOSEC) 20 MG capsule; Take 1 capsule (20 mg total) by mouth once daily.  Dispense: 90 capsule; Refill: 3    Arthritis  -     meloxicam (MOBIC) 7.5 MG tablet; Take 1 tablet (7.5 mg total) by mouth daily as needed for Pain.  Dispense: 90 tablet; Refill: 3    Essential hypertension  -     atenolol (TENORMIN) 25 MG tablet; Take 1 tablet (25 mg total) by mouth once daily.  Dispense: 90 tablet; Refill: 3    Rash  -     fluocinonide 0.05% (LIDEX) 0.05 % cream; Apply topically 2 (two) times daily as needed.  Dispense: 180 g; Refill: 3    Allergic alveolitis  -     montelukast (SINGULAIR) 10 mg tablet; Take 1 tablet (10 mg total) by mouth every evening.  Dispense: 90 tablet; Refill: 3  -     fluticasone (FLONASE) 50 mcg/actuation nasal spray; 2 sprays by Each Nare route once daily.  Dispense: 3 Bottle; Refill: 3    History of atrial fibrillation    Other orders  -     lisinopril (PRINIVIL,ZESTRIL) 5 MG tablet; Take 1 tablet (5 mg total) by mouth once daily.  Dispense: 90 tablet; Refill: 3  -     Influenza - High Dose (65+) (PF) (IM)

## 2018-03-06 ENCOUNTER — OFFICE VISIT (OUTPATIENT)
Dept: VASCULAR SURGERY | Facility: CLINIC | Age: 78
End: 2018-03-06
Payer: MEDICARE

## 2018-03-06 ENCOUNTER — HOSPITAL ENCOUNTER (OUTPATIENT)
Dept: VASCULAR SURGERY | Facility: CLINIC | Age: 78
Discharge: HOME | End: 2018-03-06
Attending: SURGERY
Payer: MEDICARE

## 2018-03-06 VITALS
SYSTOLIC BLOOD PRESSURE: 105 MMHG | BODY MASS INDEX: 28.57 KG/M2 | DIASTOLIC BLOOD PRESSURE: 65 MMHG | TEMPERATURE: 98 F | HEART RATE: 72 BPM | HEIGHT: 60 IN | WEIGHT: 145.5 LBS

## 2018-03-06 DIAGNOSIS — Z98.890 S/P AAA (ABDOMINAL AORTIC ANEURYSM) REPAIR: Primary | ICD-10-CM

## 2018-03-06 DIAGNOSIS — Z86.79 S/P AAA (ABDOMINAL AORTIC ANEURYSM) REPAIR: Primary | ICD-10-CM

## 2018-03-06 DIAGNOSIS — Z98.890 S/P AAA (ABDOMINAL AORTIC ANEURYSM) REPAIR: ICD-10-CM

## 2018-03-06 DIAGNOSIS — Z86.79 S/P AAA (ABDOMINAL AORTIC ANEURYSM) REPAIR: ICD-10-CM

## 2018-03-06 PROCEDURE — 99999 PR PBB SHADOW E&M-EST. PATIENT-LVL III: CPT | Mod: PBBFAC,,, | Performed by: SURGERY

## 2018-03-06 PROCEDURE — 99213 OFFICE O/P EST LOW 20 MIN: CPT | Mod: PBBFAC,25 | Performed by: SURGERY

## 2018-03-06 PROCEDURE — 93978 VASCULAR STUDY: CPT | Mod: 26,S$PBB,, | Performed by: SURGERY

## 2018-03-06 PROCEDURE — 99214 OFFICE O/P EST MOD 30 MIN: CPT | Mod: S$PBB,,, | Performed by: SURGERY

## 2018-03-06 PROCEDURE — 93978 VASCULAR STUDY: CPT | Mod: PBBFAC | Performed by: SURGERY

## 2018-03-06 NOTE — PROGRESS NOTES
See my prior note; review of systems, family history and social history are   unchanged.    A 77 year-old female status post:  1.  Endovascular repair, thoracic aortic aneurysm (TEVAR) 03/28/2011.  2.  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    This is a 1 yr f/u.  She denies any abdominal or back pain.      PHYSICAL EXAMINATION:  VITAL SIGNS:  See nursing note.  ABDOMEN:  Soft and nontender.  EXTREMITIES:  Femoral pulses are 2+.    US today shows R CI 3.3 (3.1 by CT 1 yr ago, 2.2 L LISHA), possible R iliac endoleak    PRIOR IMAGING:  CT scan  was personally reviewed and demonstrates:  1. Complete involution of thoracic aneurysm with no endoleak.  2. Right common iliac aneurysm sac of 3.1 compared with 3.2 from three years   ago.  I do not appreciate a clear endoleak.    Of note, the distal seal on the right common iliac is only 1 cm and very   calcific.  This was done to preserve the right hypogastric since the left was   embolized at the time of this procedure.    A 2.2 cm left common iliac aneurysm sac, which is stable from prior.    ASSESSMENT:  7-year followup, stage TEVAR and bilateral iliac aneurysm   repair.    The right common iliac aneurysm sac is not significantly changed (2mm by US).  There is a short distal   seal zone that I disagree with decision this has a distal endoleak.  Given the   stability of the aneurysm sac, I would recommend continued observation rather   than extending this.    Given the controversy with endoleak in luan area in the past, and the mosdest keith change, I do not feel this needs a CTA now    PLAN:  Follow up in one year chest/abd/pelvis CTA    PRASANNA Segal III, MD, FACS  Professor and Chief, Vascular and Endovascular Surgery

## 2018-09-10 DIAGNOSIS — J67.9: ICD-10-CM

## 2018-09-10 DIAGNOSIS — F32.A DEPRESSION, UNSPECIFIED DEPRESSION TYPE: ICD-10-CM

## 2018-09-10 DIAGNOSIS — R46.89 OUTBURSTS OF EXPLOSIVE BEHAVIOR: ICD-10-CM

## 2018-09-10 DIAGNOSIS — R21 RASH: ICD-10-CM

## 2018-09-10 DIAGNOSIS — M19.90 ARTHRITIS: ICD-10-CM

## 2018-09-10 DIAGNOSIS — K21.9 GASTROESOPHAGEAL REFLUX DISEASE, ESOPHAGITIS PRESENCE NOT SPECIFIED: ICD-10-CM

## 2018-09-10 DIAGNOSIS — I10 ESSENTIAL HYPERTENSION: ICD-10-CM

## 2018-09-10 NOTE — TELEPHONE ENCOUNTER
----- Message from Ken Carlos sent at 9/10/2018  1:20 PM CDT -----  Contact: [phone]  Patient advised she need all eight of her medications refilled and sent to [facility] . Please call.    atenolol (TENORMIN) 25 MG tablet  fluocinonide 0.05% (LIDEX) 0.05 % cream  fluticasone (FLONASE) 50 mcg/actuation nasal spray  lisinopril (PRINIVIL,ZESTRIL) 5 MG tablet  meloxicam (MOBIC) 7.5 MG tablet  montelukast (SINGULAIR) 10 mg tablet  omeprazole (PRILOSEC) 20 MG capsule  paroxetine (PAXIL) 10 MG tablet

## 2018-09-11 RX ORDER — FLUTICASONE PROPIONATE 50 MCG
2 SPRAY, SUSPENSION (ML) NASAL DAILY
Qty: 3 BOTTLE | Refills: 0 | OUTPATIENT
Start: 2018-09-11

## 2018-09-11 RX ORDER — LISINOPRIL 5 MG/1
5 TABLET ORAL DAILY
Qty: 90 TABLET | Refills: 0 | OUTPATIENT
Start: 2018-09-11

## 2018-09-11 RX ORDER — FLUOCINONIDE 0.5 MG/G
CREAM TOPICAL 2 TIMES DAILY PRN
Qty: 180 G | Refills: 0 | OUTPATIENT
Start: 2018-09-11

## 2018-09-11 RX ORDER — ATENOLOL 25 MG/1
25 TABLET ORAL DAILY
Qty: 90 TABLET | Refills: 0 | OUTPATIENT
Start: 2018-09-11

## 2018-09-11 RX ORDER — MELOXICAM 7.5 MG/1
7.5 TABLET ORAL DAILY PRN
Qty: 90 TABLET | Refills: 0 | OUTPATIENT
Start: 2018-09-11

## 2018-09-11 RX ORDER — MONTELUKAST SODIUM 10 MG/1
10 TABLET ORAL NIGHTLY
Qty: 90 TABLET | Refills: 0 | OUTPATIENT
Start: 2018-09-11

## 2018-09-11 RX ORDER — OMEPRAZOLE 20 MG/1
20 CAPSULE, DELAYED RELEASE ORAL DAILY
Qty: 90 CAPSULE | Refills: 0 | OUTPATIENT
Start: 2018-09-11

## 2018-09-11 RX ORDER — PAROXETINE 10 MG/1
10 TABLET, FILM COATED ORAL EVERY MORNING
Qty: 90 TABLET | Refills: 0 | OUTPATIENT
Start: 2018-09-11

## 2018-09-11 NOTE — TELEPHONE ENCOUNTER
----- Message from Lizbeth Winchester sent at 9/11/2018  3:30 PM CDT -----  Contact: [phone]  Pt its requesting refill on all of her medications sent to [facility] . Please advise

## 2018-09-11 NOTE — TELEPHONE ENCOUNTER
Spoke to patients, MADE APPOINTMENT to come follow up. She will be out of her medication before the appt. Please refill to [facility]

## 2018-09-25 ENCOUNTER — OFFICE VISIT (OUTPATIENT)
Dept: INTERNAL MEDICINE | Facility: CLINIC | Age: 78
End: 2018-09-25
Payer: MEDICARE

## 2018-09-25 VITALS
DIASTOLIC BLOOD PRESSURE: 60 MMHG | TEMPERATURE: 98 F | HEIGHT: 64 IN | WEIGHT: 149.19 LBS | HEART RATE: 65 BPM | SYSTOLIC BLOOD PRESSURE: 102 MMHG | OXYGEN SATURATION: 97 % | BODY MASS INDEX: 25.47 KG/M2

## 2018-09-25 DIAGNOSIS — J67.9: ICD-10-CM

## 2018-09-25 DIAGNOSIS — I48.20 CHRONIC ATRIAL FIBRILLATION: Primary | ICD-10-CM

## 2018-09-25 DIAGNOSIS — Z79.01 ANTICOAGULATED: ICD-10-CM

## 2018-09-25 DIAGNOSIS — I10 ESSENTIAL HYPERTENSION: ICD-10-CM

## 2018-09-25 DIAGNOSIS — E78.5 HYPERLIPIDEMIA, UNSPECIFIED HYPERLIPIDEMIA TYPE: ICD-10-CM

## 2018-09-25 DIAGNOSIS — F32.A DEPRESSION, UNSPECIFIED DEPRESSION TYPE: ICD-10-CM

## 2018-09-25 DIAGNOSIS — K21.9 GASTROESOPHAGEAL REFLUX DISEASE, ESOPHAGITIS PRESENCE NOT SPECIFIED: ICD-10-CM

## 2018-09-25 DIAGNOSIS — R46.89 OUTBURSTS OF EXPLOSIVE BEHAVIOR: ICD-10-CM

## 2018-09-25 PROCEDURE — 99213 OFFICE O/P EST LOW 20 MIN: CPT | Mod: PBBFAC,PN | Performed by: INTERNAL MEDICINE

## 2018-09-25 PROCEDURE — 99214 OFFICE O/P EST MOD 30 MIN: CPT | Mod: S$PBB,,, | Performed by: INTERNAL MEDICINE

## 2018-09-25 PROCEDURE — 90662 IIV NO PRSV INCREASED AG IM: CPT | Mod: PBBFAC,PN

## 2018-09-25 PROCEDURE — 99999 PR PBB SHADOW E&M-EST. PATIENT-LVL III: CPT | Mod: PBBFAC,,, | Performed by: INTERNAL MEDICINE

## 2018-09-25 RX ORDER — ATENOLOL 25 MG/1
25 TABLET ORAL DAILY
Qty: 90 TABLET | Refills: 3 | Status: SHIPPED | OUTPATIENT
Start: 2018-09-25 | End: 2018-09-25 | Stop reason: SDUPTHER

## 2018-09-25 RX ORDER — ATORVASTATIN CALCIUM 20 MG/1
20 TABLET, FILM COATED ORAL DAILY
Qty: 90 TABLET | Refills: 3 | Status: SHIPPED | OUTPATIENT
Start: 2018-09-25 | End: 2019-10-01 | Stop reason: SDUPTHER

## 2018-09-25 RX ORDER — OMEPRAZOLE 20 MG/1
20 CAPSULE, DELAYED RELEASE ORAL DAILY
Qty: 90 CAPSULE | Refills: 3 | Status: SHIPPED | OUTPATIENT
Start: 2018-09-25 | End: 2019-09-17 | Stop reason: SDUPTHER

## 2018-09-25 RX ORDER — PAROXETINE 10 MG/1
10 TABLET, FILM COATED ORAL EVERY MORNING
Qty: 90 TABLET | Refills: 3 | Status: SHIPPED | OUTPATIENT
Start: 2018-09-25 | End: 2019-09-09 | Stop reason: SDUPTHER

## 2018-09-25 RX ORDER — MONTELUKAST SODIUM 10 MG/1
10 TABLET ORAL NIGHTLY
Qty: 90 TABLET | Refills: 3 | Status: SHIPPED | OUTPATIENT
Start: 2018-09-25 | End: 2019-09-17 | Stop reason: SDUPTHER

## 2018-09-25 RX ORDER — LISINOPRIL 5 MG/1
5 TABLET ORAL DAILY
Qty: 90 TABLET | Refills: 3 | Status: SHIPPED | OUTPATIENT
Start: 2018-09-25 | End: 2020-12-03

## 2018-09-25 RX ORDER — ATENOLOL 25 MG/1
25 TABLET ORAL DAILY
Qty: 30 TABLET | Refills: 3 | Status: SHIPPED | OUTPATIENT
Start: 2018-09-25 | End: 2020-06-24

## 2018-09-25 NOTE — PROGRESS NOTES
"Subjective:      Patient ID: Sarah Rice is a 77 y.o. female.    Chief Complaint: Follow-up; Hypertension; and Hyperlipidemia    HPI: 77 y.o. White female, here with her daughter.  She is very happy after a very profitable visit to the [facility].  However, she is noticing, that she does not fall asleep easily, and often it is not until she has a cup  Of hot tea at 5am, that she sleeps and then awakens, refreshed at 11am.  She does not have anything particular to do, so she does what she wants,when she wants.  Goes out on trips.  Nothing hurts her.  No CP,palpitations.  No SOB,wheezing.  No headaches,numbness.  No No N,V,D,C.  No dysuria,hematuria.       Review of Systems   All other systems reviewed and are negative.      Objective:   /60 (BP Location: Left arm, Patient Position: Sitting, BP Method: Large (Manual))   Pulse 65   Temp 97.6 °F (36.4 °C) (Oral)   Ht 5' 4" (1.626 m)   Wt 67.7 kg (149 lb 3.2 oz)   SpO2 97%   BMI 25.61 kg/m²     Physical Exam   Constitutional: She is oriented to person, place, and time. She appears well-developed and well-nourished.   HENT:   Head: Normocephalic and atraumatic.   Right Ear: External ear normal.   Left Ear: External ear normal.   Nose: Nose normal.   Mouth/Throat: Oropharynx is clear and moist.   Eyes: Conjunctivae and EOM are normal. Pupils are equal, round, and reactive to light.   Neck: Normal range of motion. Neck supple.   Cardiovascular: Normal rate, regular rhythm and normal heart sounds.   Pulmonary/Chest: Effort normal and breath sounds normal.   Abdominal: Soft. Bowel sounds are normal.   Musculoskeletal: Normal range of motion.   Neurological: She is alert and oriented to person, place, and time.   Skin: Skin is warm and dry.   Psychiatric: She has a normal mood and affect. Her behavior is normal. Judgment and thought content normal.   Nursing note and vitals reviewed.  Beautifully coiffed,dressed.    Assessment:     1. Chronic atrial " fibrillation    2. Anticoagulated    3. Essential hypertension    4. Hyperlipidemia, unspecified hyperlipidemia type    5. Allergic alveolitis    6. Gastroesophageal reflux disease, esophagitis presence not specified    7. Depression, unspecified depression type    8. Outbursts of explosive behavior    Try warm tea at night @ 9:30pm to try to sleep.  Plan:     Chronic atrial fibrillation    Anticoagulated    Essential hypertension  -     CBC auto differential; Future; Expected date: 09/25/2018  -     Comprehensive metabolic panel; Future; Expected date: 09/25/2018  -     Urinalysis; Future; Expected date: 09/25/2018  -     Discontinue: atenolol (TENORMIN) 25 MG tablet; Take 1 tablet (25 mg total) by mouth once daily.  Dispense: 90 tablet; Refill: 3  -     lisinopril (PRINIVIL,ZESTRIL) 5 MG tablet; Take 1 tablet (5 mg total) by mouth once daily.  Dispense: 90 tablet; Refill: 3  -     atenolol (TENORMIN) 25 MG tablet; Take 1 tablet (25 mg total) by mouth once daily.  Dispense: 30 tablet; Refill: 3    Hyperlipidemia, unspecified hyperlipidemia type  -     Lipid panel; Future; Expected date: 09/25/2018  -     atorvastatin (LIPITOR) 20 MG tablet; Take 1 tablet (20 mg total) by mouth once daily.  Dispense: 90 tablet; Refill: 3    Allergic alveolitis  -     montelukast (SINGULAIR) 10 mg tablet; Take 1 tablet (10 mg total) by mouth every evening.  Dispense: 90 tablet; Refill: 3    Gastroesophageal reflux disease, esophagitis presence not specified  -     omeprazole (PRILOSEC) 20 MG capsule; Take 1 capsule (20 mg total) by mouth once daily.  Dispense: 90 capsule; Refill: 3    Depression, unspecified depression type  -     paroxetine (PAXIL) 10 MG tablet; Take 1 tablet (10 mg total) by mouth every morning.  Dispense: 90 tablet; Refill: 3    Outbursts of explosive behavior  -     paroxetine (PAXIL) 10 MG tablet; Take 1 tablet (10 mg total) by mouth every morning.  Dispense: 90 tablet; Refill: 3    Other orders  -     Influenza  - High Dose (65+) (PF) (IM)

## 2018-10-25 DIAGNOSIS — M19.90 ARTHRITIS: ICD-10-CM

## 2018-10-25 RX ORDER — MELOXICAM 7.5 MG/1
7.5 TABLET ORAL DAILY
Qty: 20 TABLET | Refills: 0 | Status: SHIPPED | OUTPATIENT
Start: 2018-10-25 | End: 2019-09-17 | Stop reason: SDUPTHER

## 2018-10-25 RX ORDER — MELOXICAM 7.5 MG/1
7.5 TABLET ORAL DAILY PRN
Qty: 90 TABLET | Refills: 3 | Status: SHIPPED | OUTPATIENT
Start: 2018-10-25 | End: 2019-09-17 | Stop reason: SDUPTHER

## 2018-10-25 NOTE — TELEPHONE ENCOUNTER
----- Message from Kelsey Durand sent at 10/25/2018 11:33 AM CDT -----  Contact: Self/ [phone]  Patient asked to speak with you since she has not received her meloxicam (MOBIC) 7.5 MG tablet.    Please call.    [id number] - [place], [place] - [id number] [facility], [address]

## 2019-01-11 DIAGNOSIS — Z98.890 S/P AAA (ABDOMINAL AORTIC ANEURYSM) REPAIR: Primary | ICD-10-CM

## 2019-01-11 DIAGNOSIS — Z86.79 S/P AAA (ABDOMINAL AORTIC ANEURYSM) REPAIR: Primary | ICD-10-CM

## 2019-06-28 DIAGNOSIS — R21 RASH: ICD-10-CM

## 2019-06-28 DIAGNOSIS — J67.9: ICD-10-CM

## 2019-06-28 RX ORDER — FLUTICASONE PROPIONATE 50 MCG
2 SPRAY, SUSPENSION (ML) NASAL DAILY
Qty: 3 BOTTLE | Refills: 0 | Status: SHIPPED | OUTPATIENT
Start: 2019-06-28 | End: 2019-10-01 | Stop reason: SDUPTHER

## 2019-06-28 RX ORDER — FLUOCINONIDE 0.5 MG/G
CREAM TOPICAL 2 TIMES DAILY PRN
Qty: 180 G | Refills: 0 | Status: SHIPPED | OUTPATIENT
Start: 2019-06-28 | End: 2019-10-01 | Stop reason: SDUPTHER

## 2019-06-28 NOTE — TELEPHONE ENCOUNTER
----- Message from Génesis Barahona sent at 6/28/2019 11:37 AM CDT -----  Contact: [phone]/self  Refill request for   1. fluocinonide 0.05% (LIDEX) 0.05 % cream  2. fluticasone (FLONASE) 50 mcg/actuation nasal spray  Pharmacy: [facility] [id number] [facility]

## 2019-09-09 DIAGNOSIS — R46.89 OUTBURSTS OF EXPLOSIVE BEHAVIOR: ICD-10-CM

## 2019-09-09 DIAGNOSIS — F32.A DEPRESSION, UNSPECIFIED DEPRESSION TYPE: ICD-10-CM

## 2019-09-09 RX ORDER — PAROXETINE 10 MG/1
10 TABLET, FILM COATED ORAL EVERY MORNING
Qty: 30 TABLET | Refills: 0 | Status: SHIPPED | OUTPATIENT
Start: 2019-09-09 | End: 2019-09-17 | Stop reason: SDUPTHER

## 2019-09-09 NOTE — TELEPHONE ENCOUNTER
----- Message from Pennie Tinoco sent at 9/9/2019  2:25 PM CDT -----  Contact: Self  Pt is calling to speak with Staff regarding a prescription refill paroxetine (PAXIL) 10 MG tablet.  In addition, pt is requesting to be scheduled for an appt.   was unable to find availability due to an exhausted template.    She can be reached at [phone].    Thank you.

## 2019-09-17 ENCOUNTER — OFFICE VISIT (OUTPATIENT)
Dept: INTERNAL MEDICINE | Facility: CLINIC | Age: 79
End: 2019-09-17
Payer: MEDICARE

## 2019-09-17 VITALS
OXYGEN SATURATION: 98 % | BODY MASS INDEX: 24.28 KG/M2 | DIASTOLIC BLOOD PRESSURE: 66 MMHG | HEART RATE: 75 BPM | SYSTOLIC BLOOD PRESSURE: 120 MMHG | RESPIRATION RATE: 16 BRPM | TEMPERATURE: 98 F | WEIGHT: 142.19 LBS | HEIGHT: 64 IN

## 2019-09-17 DIAGNOSIS — E78.5 HYPERLIPIDEMIA, UNSPECIFIED HYPERLIPIDEMIA TYPE: Primary | ICD-10-CM

## 2019-09-17 DIAGNOSIS — R46.89 OUTBURSTS OF EXPLOSIVE BEHAVIOR: ICD-10-CM

## 2019-09-17 DIAGNOSIS — M19.90 ARTHRITIS: ICD-10-CM

## 2019-09-17 DIAGNOSIS — I10 ESSENTIAL HYPERTENSION: ICD-10-CM

## 2019-09-17 DIAGNOSIS — F32.A DEPRESSION, UNSPECIFIED DEPRESSION TYPE: ICD-10-CM

## 2019-09-17 DIAGNOSIS — J67.9: ICD-10-CM

## 2019-09-17 DIAGNOSIS — K21.9 GASTROESOPHAGEAL REFLUX DISEASE, ESOPHAGITIS PRESENCE NOT SPECIFIED: ICD-10-CM

## 2019-09-17 PROCEDURE — 99214 PR OFFICE/OUTPT VISIT, EST, LEVL IV, 30-39 MIN: ICD-10-PCS | Mod: S$PBB,,, | Performed by: INTERNAL MEDICINE

## 2019-09-17 PROCEDURE — 99999 PR PBB SHADOW E&M-EST. PATIENT-LVL IV: ICD-10-PCS | Mod: PBBFAC,,, | Performed by: INTERNAL MEDICINE

## 2019-09-17 PROCEDURE — 99214 OFFICE O/P EST MOD 30 MIN: CPT | Mod: S$PBB,,, | Performed by: INTERNAL MEDICINE

## 2019-09-17 PROCEDURE — 99999 PR PBB SHADOW E&M-EST. PATIENT-LVL IV: CPT | Mod: PBBFAC,,, | Performed by: INTERNAL MEDICINE

## 2019-09-17 PROCEDURE — 99214 OFFICE O/P EST MOD 30 MIN: CPT | Mod: PBBFAC,PN | Performed by: INTERNAL MEDICINE

## 2019-09-17 RX ORDER — METOPROLOL SUCCINATE 25 MG/1
25 TABLET, EXTENDED RELEASE ORAL DAILY
COMMUNITY
End: 2022-01-01

## 2019-09-17 RX ORDER — FUROSEMIDE 20 MG/1
20 TABLET ORAL DAILY PRN
Status: ON HOLD | COMMUNITY
End: 2022-01-01

## 2019-09-17 RX ORDER — TRAZODONE HYDROCHLORIDE 50 MG/1
50 TABLET ORAL NIGHTLY PRN
Status: ON HOLD | COMMUNITY
End: 2022-01-01

## 2019-09-18 RX ORDER — PAROXETINE 10 MG/1
10 TABLET, FILM COATED ORAL EVERY MORNING
Qty: 30 TABLET | Refills: 3 | Status: SHIPPED | OUTPATIENT
Start: 2019-09-18 | End: 2019-10-01 | Stop reason: SDUPTHER

## 2019-09-18 RX ORDER — OMEPRAZOLE 20 MG/1
20 CAPSULE, DELAYED RELEASE ORAL DAILY
Qty: 90 CAPSULE | Refills: 3 | Status: SHIPPED | OUTPATIENT
Start: 2019-09-18 | End: 2020-06-24 | Stop reason: SDUPTHER

## 2019-09-18 RX ORDER — MELOXICAM 7.5 MG/1
7.5 TABLET ORAL DAILY PRN
Qty: 90 TABLET | Refills: 3 | Status: SHIPPED | OUTPATIENT
Start: 2019-09-18 | End: 2019-10-01 | Stop reason: SDUPTHER

## 2019-09-18 RX ORDER — MONTELUKAST SODIUM 10 MG/1
10 TABLET ORAL NIGHTLY
Qty: 90 TABLET | Refills: 3 | Status: SHIPPED | OUTPATIENT
Start: 2019-09-18 | End: 2019-10-01 | Stop reason: SDUPTHER

## 2019-09-29 NOTE — PROGRESS NOTES
"INTERNAL MEDICINE    Patient Active Problem List   Diagnosis    Osteoporosis, senile    HTN (hypertension)    Allergic rhinitis    Hyperlipidemia    S/P AAA (abdominal aortic aneurysm) repair    Outbursts of explosive behavior    Hypertension    Anxiety    Gastroesophageal reflux disease without esophagitis       CC:   Chief Complaint   Patient presents with    Hypertension     6 month follow up    Medication Refill    Colonoscopy    Tetanus Vaccine       SUBJECTIVE:  Sarah Rice   is a 78 y.o. female  HPI   78y/oWF here for her follow up for HTN. She denies headache,dizziness,numbness,weakness,CP,palpitations,chest heaviness,SOB,HASTINGS,PND,orthopnea,edema.    Additionally she has hyperlipidemia. No claudication, unisided weakness,facial droop.    No worsening of her GERD symptoms.  She has had episodes of explosive anger in the past, but these are well controlled with the Paroxitine.    ROS: Review of Systems   Constitutional: Negative for activity change and unexpected weight change.   HENT: Negative for hearing loss, rhinorrhea and trouble swallowing.    Eyes: Negative for discharge and visual disturbance.   Respiratory: Negative for chest tightness and wheezing.    Cardiovascular: Negative for chest pain and palpitations.   Gastrointestinal: Negative for blood in stool, constipation, diarrhea and vomiting.   Endocrine: Negative for polydipsia and polyuria.   Genitourinary: Negative for difficulty urinating, dysuria, hematuria and menstrual problem.   Musculoskeletal: Positive for arthralgias. Negative for joint swelling and neck pain.   Neurological: Negative for weakness and headaches.   Psychiatric/Behavioral: Negative for confusion and dysphoric mood.       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    " GERD (gastroesophageal reflux disease)     hiatel hernia present    Hypertension     Osteoporosis     Outbursts of explosive behavior 10/24/2013    Sinus congestion     Stroke        Past Surgical History:   Procedure Laterality Date    CATARACT LEFT EYE Left     EYE SURGERY Left     cataract    HERNIA REPAIR      HYSTERECTOMY      repair of aneuysm      varicose veins         Family History   Problem Relation Age of Onset    Stroke Mother     Cancer Father     Stroke Sister     Amblyopia Neg Hx     Blindness Neg Hx     Cataracts Neg Hx     Diabetes Neg Hx     Glaucoma Neg Hx     Hypertension Neg Hx     Macular degeneration Neg Hx     Retinal detachment Neg Hx     Strabismus Neg Hx     Thyroid disease Neg Hx        Social History     Tobacco Use    Smoking status: Former Smoker     Packs/day: 0.50     Years: 14.00     Pack years: 7.00     Last attempt to quit: 1999     Years since quittin.4    Smokeless tobacco: Never Used   Substance Use Topics    Alcohol use: No    Drug use: No       Social History     Social History Narrative    Not on file       ALLERGIES:   Review of patient's allergies indicates:   Allergen Reactions    Anesthetics - amide type      Patient unsure of med - unable to be awakened.    Patient has tolerated topical local anesthetic for cataract procedure without issues    Anesthetics - jordan type- parabens      Patient unsure of med - unable to be awakened.    Patient has tolerated topical local anesthetic for cataract procedure without issues       [facility]:   Current Outpatient Medications:     atorvastatin (LIPITOR) 20 MG tablet, Take 1 tablet (20 mg total) by mouth once daily., Disp: 90 tablet, Rfl: 3    furosemide (LASIX) 20 MG tablet, Take 20 mg by mouth 2 (two) times daily., Disp: , Rfl:     lisinopril (PRINIVIL,ZESTRIL) 5 MG tablet, Take 1 tablet (5 mg total) by mouth once daily., Disp: 90 tablet, Rfl: 3    meloxicam (MOBIC) 7.5 MG tablet, Take 1  "tablet (7.5 mg total) by mouth daily as needed for Pain., Disp: 90 tablet, Rfl: 3    metoprolol succinate (TOPROL XL) 25 MG 24 hr tablet, Take 25 mg by mouth once daily., Disp: , Rfl:     montelukast (SINGULAIR) 10 mg tablet, Take 1 tablet (10 mg total) by mouth every evening., Disp: 90 tablet, Rfl: 3    omeprazole (PRILOSEC) 20 MG capsule, Take 1 capsule (20 mg total) by mouth once daily., Disp: 90 capsule, Rfl: 3    paroxetine (PAXIL) 10 MG tablet, Take 1 tablet (10 mg total) by mouth every morning., Disp: 30 tablet, Rfl: 3    rivaroxaban (XARELTO) 20 mg Tab, Take 20 mg by mouth daily with dinner or evening meal., Disp: , Rfl:     traZODone (DESYREL) 50 MG tablet, Take 50 mg by mouth every evening., Disp: , Rfl:     atenolol (TENORMIN) 25 MG tablet, Take 1 tablet (25 mg total) by mouth once daily., Disp: 30 tablet, Rfl: 3    fluocinonide 0.05% (LIDEX) 0.05 % cream, Apply topically 2 (two) times daily as needed., Disp: 180 g, Rfl: 0    fluticasone propionate (FLONASE) 50 mcg/actuation nasal spray, 2 sprays (100 mcg total) by Each Nare route once daily., Disp: 3 Bottle, Rfl: 0    OBJECTIVE:   Vitals:    09/17/19 1332   BP: 120/66   BP Location: Left arm   Patient Position: Sitting   BP Method: X-Large (Manual)   Pulse: 75   Resp: 16   Temp: 97.5 °F (36.4 °C)   TempSrc: Oral   SpO2: 98%   Weight: 64.5 kg (142 lb 3.2 oz)   Height: 5' 4" (1.626 m)     Body mass index is 24.41 kg/m².    Physical Exam   Constitutional: She is oriented to person, place, and time. She appears well-developed and well-nourished.   HENT:   Head: Normocephalic and atraumatic.   Right Ear: External ear normal.   Left Ear: External ear normal.   Nose: Nose normal.   Mouth/Throat: Oropharynx is clear and moist.   Eyes: Pupils are equal, round, and reactive to light. Conjunctivae and EOM are normal.   Neck: Normal range of motion. Neck supple.   Cardiovascular: Normal rate, regular rhythm and normal heart sounds.   Pulmonary/Chest: Effort " normal and breath sounds normal.   Abdominal: Soft. Bowel sounds are normal.   Musculoskeletal: Normal range of motion.   Neurological: She is alert and oriented to person, place, and time.   Skin: Skin is warm and dry.   Psychiatric: She has a normal mood and affect. Her behavior is normal. Judgment and thought content normal.   Nursing note and vitals reviewed.        PERTINENT RESULTS:   CBC:  Recent Labs   Lab Result Units 09/23/19  0903   WBC K/uL 7.17   RBC M/uL 3.70*   Hemoglobin g/dL 11.6*   Hematocrit % 36.4*   Platelets K/uL 212   Mean Corpuscular Volume fL 98   Mean Corpuscular Hemoglobin pg 31.4*   Mean Corpuscular Hemoglobin Conc g/dL 31.9*     CMP:  Recent Labs   Lab Result Units 09/23/19  0904   Glucose mg/dL 96   Calcium mg/dL 9.5   Albumin g/dL 4.0   Total Protein g/dL 7.7   Sodium mmol/L 140   Potassium mmol/L 4.6   CO2 mmol/L 26   Chloride mmol/L 105   BUN, Bld mg/dL 20*   Alkaline Phosphatase U/L 87   ALT U/L 15   AST U/L 30   Total Bilirubin mg/dL 0.8     URINALYSIS:  Recent Labs   Lab Result Units 09/23/19  0858   Color, UA  Yellow   Specific Gravity, UA  1.020   pH, UA  6.0   Protein, UA  Negative   Bacteria /hpf Moderate*   Nitrite, UA  Positive*   Leukocytes, UA  Trace*   Urobilinogen, UA EU/dL Negative      LIPIDS:  Recent Labs   Lab Result Units 09/23/19  0904   HDL mg/dL 43   Cholesterol mg/dL 101*   Triglycerides mg/dL 78   LDL Cholesterol mg/dL 42.4*   Hdl/Cholesterol Ratio % 42.6   Non-HDL Cholesterol mg/dL 58   Total Cholesterol/HDL Ratio  2.3             ASSESSMENT:  Problem List Items Addressed This Visit        Psychiatric    Outbursts of explosive behavior    Relevant Medications    paroxetine (PAXIL) 10 MG tablet       Cardiac/Vascular    Hyperlipidemia - Primary    Hypertension      Other Visit Diagnoses     Depression, unspecified depression type        Relevant Medications    paroxetine (PAXIL) 10 MG tablet    Gastroesophageal reflux disease, esophagitis presence not specified         Relevant Medications    omeprazole (PRILOSEC) 20 MG capsule    Arthritis        Relevant Medications    meloxicam (MOBIC) 7.5 MG tablet    Allergic alveolitis        Relevant Medications    montelukast (SINGULAIR) 10 mg tablet          PLAN:   Orders Placed This Encounter    paroxetine (PAXIL) 10 MG tablet    omeprazole (PRILOSEC) 20 MG capsule    meloxicam (MOBIC) 7.5 MG tablet    montelukast (SINGULAIR) 10 mg tablet     No orders of the defined types were placed in this encounter.      Follow-up with me in 6months unless labs abnormal.  Dr. Govind Jim  Internal Medicine

## 2019-10-01 DIAGNOSIS — R21 RASH: ICD-10-CM

## 2019-10-01 DIAGNOSIS — M19.90 ARTHRITIS: ICD-10-CM

## 2019-10-01 DIAGNOSIS — I10 ESSENTIAL HYPERTENSION: ICD-10-CM

## 2019-10-01 DIAGNOSIS — J67.9: ICD-10-CM

## 2019-10-01 DIAGNOSIS — F32.A DEPRESSION, UNSPECIFIED DEPRESSION TYPE: ICD-10-CM

## 2019-10-01 DIAGNOSIS — E78.5 HYPERLIPIDEMIA, UNSPECIFIED HYPERLIPIDEMIA TYPE: ICD-10-CM

## 2019-10-01 DIAGNOSIS — R46.89 OUTBURSTS OF EXPLOSIVE BEHAVIOR: ICD-10-CM

## 2019-10-01 RX ORDER — ATORVASTATIN CALCIUM 20 MG/1
20 TABLET, FILM COATED ORAL DAILY
Qty: 90 TABLET | Refills: 0 | Status: SHIPPED | OUTPATIENT
Start: 2019-10-01 | End: 2020-03-05 | Stop reason: SDUPTHER

## 2019-10-01 RX ORDER — METOPROLOL SUCCINATE 25 MG/1
25 TABLET, EXTENDED RELEASE ORAL DAILY
Status: CANCELLED | OUTPATIENT
Start: 2019-10-01

## 2019-10-01 RX ORDER — FLUTICASONE PROPIONATE 50 MCG
2 SPRAY, SUSPENSION (ML) NASAL DAILY
Qty: 3 BOTTLE | Refills: 0 | Status: SHIPPED | OUTPATIENT
Start: 2019-10-01 | End: 2019-10-22 | Stop reason: SDUPTHER

## 2019-10-01 RX ORDER — PAROXETINE 10 MG/1
10 TABLET, FILM COATED ORAL EVERY MORNING
Qty: 30 TABLET | Refills: 0 | Status: SHIPPED | OUTPATIENT
Start: 2019-10-01 | End: 2019-10-22 | Stop reason: SDUPTHER

## 2019-10-01 RX ORDER — FLUOCINONIDE 0.5 MG/G
CREAM TOPICAL 2 TIMES DAILY PRN
Qty: 180 G | Refills: 0 | Status: SHIPPED | OUTPATIENT
Start: 2019-10-01 | End: 2020-03-06 | Stop reason: SDUPTHER

## 2019-10-01 RX ORDER — MONTELUKAST SODIUM 10 MG/1
10 TABLET ORAL NIGHTLY
Qty: 90 TABLET | Refills: 0 | Status: SHIPPED | OUTPATIENT
Start: 2019-10-01 | End: 2020-03-05 | Stop reason: SDUPTHER

## 2019-10-01 RX ORDER — MELOXICAM 7.5 MG/1
7.5 TABLET ORAL DAILY PRN
Qty: 30 TABLET | Refills: 0 | Status: SHIPPED | OUTPATIENT
Start: 2019-10-01 | End: 2019-10-22 | Stop reason: SDUPTHER

## 2019-10-01 NOTE — TELEPHONE ENCOUNTER
----- Message from Veronica Cueto sent at 10/1/2019 10:37 AM CDT -----  Contact: self / [phone]  Patient is requesting a call back regarding, her medication. Please advise

## 2019-10-22 DIAGNOSIS — F32.A DEPRESSION, UNSPECIFIED DEPRESSION TYPE: ICD-10-CM

## 2019-10-22 DIAGNOSIS — J67.9: ICD-10-CM

## 2019-10-22 DIAGNOSIS — M19.90 ARTHRITIS: ICD-10-CM

## 2019-10-22 DIAGNOSIS — R46.89 OUTBURSTS OF EXPLOSIVE BEHAVIOR: ICD-10-CM

## 2019-10-22 RX ORDER — PAROXETINE 10 MG/1
10 TABLET, FILM COATED ORAL EVERY MORNING
Qty: 90 TABLET | Refills: 1 | Status: SHIPPED | OUTPATIENT
Start: 2019-10-22 | End: 2020-05-22 | Stop reason: SDUPTHER

## 2019-10-22 RX ORDER — FLUTICASONE PROPIONATE 50 MCG
2 SPRAY, SUSPENSION (ML) NASAL DAILY
Qty: 6 BOTTLE | Refills: 1 | Status: SHIPPED | OUTPATIENT
Start: 2019-10-22 | End: 2021-04-30 | Stop reason: SDUPTHER

## 2019-10-22 RX ORDER — MELOXICAM 7.5 MG/1
7.5 TABLET ORAL DAILY PRN
Qty: 90 TABLET | Refills: 1 | Status: SHIPPED | OUTPATIENT
Start: 2019-10-22 | End: 2020-05-22 | Stop reason: SDUPTHER

## 2019-10-22 NOTE — TELEPHONE ENCOUNTER
----- Message from June Servin sent at 10/22/2019  3:37 PM CDT -----  Contact: [phone]/self   Patient is requesting to speak with you regarding paroxetine (PAXIL) 10 MG tablet, meloxicam (MOBIC) 7.5 MG tablet,  and fluticasone propionate (FLONASE) 50 mcg/actuation nasal spray.  [facility] - [id number]. Please advise.

## 2019-11-05 ENCOUNTER — CLINICAL SUPPORT (OUTPATIENT)
Dept: INTERNAL MEDICINE | Facility: CLINIC | Age: 79
End: 2019-11-05
Payer: MEDICARE

## 2019-11-05 DIAGNOSIS — Z23 INFLUENZA VACCINE NEEDED: Primary | ICD-10-CM

## 2019-11-05 PROCEDURE — 90662 IIV NO PRSV INCREASED AG IM: CPT | Mod: PBBFAC,PN

## 2019-11-05 NOTE — PROGRESS NOTES
Pt received flu vaccine. No adverse reactions or effects noted. No sxs of distress. Pt tolerated vaccine well. Instructed pt to wait 15 min after admin. Pt verbalized understanding.

## 2020-03-05 DIAGNOSIS — E78.5 HYPERLIPIDEMIA, UNSPECIFIED HYPERLIPIDEMIA TYPE: ICD-10-CM

## 2020-03-05 DIAGNOSIS — Z79.01 LONG TERM (CURRENT) USE OF ANTICOAGULANTS: ICD-10-CM

## 2020-03-05 DIAGNOSIS — J67.9: ICD-10-CM

## 2020-03-05 DIAGNOSIS — I10 ESSENTIAL HYPERTENSION: Primary | ICD-10-CM

## 2020-03-05 DIAGNOSIS — E78.00 PURE HYPERCHOLESTEROLEMIA: ICD-10-CM

## 2020-03-05 DIAGNOSIS — J31.0 CHRONIC RHINITIS: ICD-10-CM

## 2020-03-05 DIAGNOSIS — R21 RASH: ICD-10-CM

## 2020-03-05 RX ORDER — ATORVASTATIN CALCIUM 20 MG/1
20 TABLET, FILM COATED ORAL DAILY
Qty: 90 TABLET | Refills: 0 | Status: SHIPPED | OUTPATIENT
Start: 2020-03-05 | End: 2020-06-24 | Stop reason: SDUPTHER

## 2020-03-05 RX ORDER — MONTELUKAST SODIUM 10 MG/1
10 TABLET ORAL NIGHTLY
Qty: 90 TABLET | Refills: 0 | Status: SHIPPED | OUTPATIENT
Start: 2020-03-05 | End: 2020-05-22 | Stop reason: SDUPTHER

## 2020-03-05 RX ORDER — FLUOCINONIDE 0.5 MG/G
CREAM TOPICAL 2 TIMES DAILY PRN
Qty: 180 G | Refills: 0 | Status: CANCELLED | OUTPATIENT
Start: 2020-03-05

## 2020-03-05 NOTE — TELEPHONE ENCOUNTER
----- Message from Neva Estrada sent at 3/5/2020  1:42 PM CST -----  Contact: Self [phone]  Patient is calling to get refills on her medication sent to [facility] - [address] [facility] [phone] (Phone) [phone] (Fax)    1. atorvastatin (LIPITOR) 20 MG tablet 90 tablet     2. montelukast (SINGULAIR) 10 mg tablet 90 tablet     3. fluocinonide 0.05% (LIDEX) 0.05 % cream 180 g

## 2020-03-06 ENCOUNTER — TELEPHONE (OUTPATIENT)
Dept: INTERNAL MEDICINE | Facility: CLINIC | Age: 80
End: 2020-03-06

## 2020-03-06 DIAGNOSIS — R21 RASH: ICD-10-CM

## 2020-03-06 RX ORDER — FLUOCINONIDE 0.5 MG/G
CREAM TOPICAL 2 TIMES DAILY PRN
Qty: 180 G | Refills: 0 | Status: SHIPPED | OUTPATIENT
Start: 2020-03-06 | End: 2020-05-22 | Stop reason: SDUPTHER

## 2020-03-06 NOTE — TELEPHONE ENCOUNTER
Please call pt. PCP retired and needs to establish with new PCP prior to further refills. Refilled for 90  days until she  can get in. Labs ordered.   1. Please schedule new PCP visit with me or Dr. Isabel  - if pt opts to see Dr. Isabel, please do not schedule labs since I ordered them and notify her that pt will establish PCP with her  - also notify me so that I can cancel labs  2. Schedule labs I ordered if establishing with me  Dr. Elsy Jacinto D.O.   [facility]

## 2020-05-22 DIAGNOSIS — R46.89 OUTBURSTS OF EXPLOSIVE BEHAVIOR: ICD-10-CM

## 2020-05-22 DIAGNOSIS — M19.90 ARTHRITIS: ICD-10-CM

## 2020-05-22 DIAGNOSIS — J31.0 CHRONIC RHINITIS: ICD-10-CM

## 2020-05-22 DIAGNOSIS — R21 RASH: ICD-10-CM

## 2020-05-22 DIAGNOSIS — F32.A DEPRESSION, UNSPECIFIED DEPRESSION TYPE: ICD-10-CM

## 2020-05-22 RX ORDER — MELOXICAM 7.5 MG/1
7.5 TABLET ORAL DAILY PRN
Qty: 90 TABLET | Refills: 0 | Status: SHIPPED | OUTPATIENT
Start: 2020-05-22 | End: 2020-06-24

## 2020-05-22 RX ORDER — FLUOCINONIDE 0.5 MG/G
CREAM TOPICAL 2 TIMES DAILY PRN
Qty: 180 G | Refills: 0 | Status: SHIPPED | OUTPATIENT
Start: 2020-05-22 | End: 2021-01-08 | Stop reason: SDUPTHER

## 2020-05-22 RX ORDER — MONTELUKAST SODIUM 10 MG/1
10 TABLET ORAL NIGHTLY
Qty: 90 TABLET | Refills: 0 | Status: SHIPPED | OUTPATIENT
Start: 2020-05-22 | End: 2020-06-24 | Stop reason: SDUPTHER

## 2020-05-22 RX ORDER — PAROXETINE 10 MG/1
10 TABLET, FILM COATED ORAL EVERY MORNING
Qty: 90 TABLET | Refills: 0 | Status: SHIPPED | OUTPATIENT
Start: 2020-05-22 | End: 2020-06-24 | Stop reason: SDUPTHER

## 2020-05-22 NOTE — TELEPHONE ENCOUNTER
----- Message from Génesis Barahona sent at 5/22/2020  1:54 PM CDT -----  Contact: [phone]/self  Refill request for   1. montelukast (SINGULAIR) 10 mg tablet  2. paroxetine (PAXIL) 10 MG tablet  3. meloxicam (MOBIC) 7.5 MG tablet  4. fluocinonide 0.05% (LIDEX) 0.05 % cream  PHARMACY: [id number]

## 2020-06-10 ENCOUNTER — PATIENT OUTREACH (OUTPATIENT)
Dept: ADMINISTRATIVE | Facility: HOSPITAL | Age: 80
End: 2020-06-10

## 2020-06-23 PROBLEM — Z79.01 CURRENT USE OF LONG TERM ANTICOAGULATION: Status: ACTIVE | Noted: 2020-06-23

## 2020-06-23 PROBLEM — I34.0 NONRHEUMATIC MITRAL VALVE REGURGITATION: Status: ACTIVE | Noted: 2020-06-23

## 2020-06-23 PROBLEM — I48.0 PAROXYSMAL ATRIAL FIBRILLATION: Status: ACTIVE | Noted: 2020-06-23

## 2020-06-23 PROBLEM — Z86.73 HISTORY OF TIA (TRANSIENT ISCHEMIC ATTACK): Status: ACTIVE | Noted: 2020-06-23

## 2020-06-23 PROBLEM — I50.32 CHRONIC DIASTOLIC HEART FAILURE: Status: ACTIVE | Noted: 2020-06-23

## 2020-06-23 NOTE — PROGRESS NOTES
FAMILY MEDICINE  [facility]    Patient Active Problem List   Diagnosis    Hyperlipidemia    S/P AAA (abdominal aortic aneurysm) repair    Essential hypertension    Generalized anxiety disorder    Gastroesophageal reflux disease without esophagitis    Paroxysmal atrial fibrillation    History of TIA (transient ischemic attack)    Nonrheumatic mitral valve regurgitation    Chronic diastolic heart failure    Current use of long term anticoagulation    Chronic rhinitis    Primary osteoarthritis involving multiple joints       CC:   Chief Complaint   Patient presents with    Establish Care       HPI: Sarah Rice is a 79 y.o. female  - with hypertension, anxiety, history of AAA repair, insomnia, hyperlipidemia, GERD, pEFHF, history of TIA and pAfib on OAC presents to establish care, review labs and refill medications. Last PCP Dr. Jim    Cardiology: Dr. Conner  CT surgery: Dr. Mckeon    1. Hypertension  - >76 yo with pEFHF, Afib, and h/o TIA  - BP goal < 130/80 if tolerated    Current medication treatment:  furosemide (LASIX) 20 MG tablet, Take 20 mg by mouth 2 (two) times daily., Disp: , Rfl:   lisinopriL (PRINIVIL,ZESTRIL) 5 MG tablet, Take 5 mg by mouth once daily., Disp: , Rfl:   metoprolol succinate (TOPROL XL) 25 MG 24 hr tablet, Take 25 mg by mouth once daily., Disp: , Rfl:     Medication side effects: denies    2. Depression and Anxiety    Psychiatrist: prior   Psychologist: none  Counselor : none    Current treatment:   paroxetine (PAXIL) 10 MG tablet, Take 1 tablet (10 mg total) by mouth every morning., Disp: 90 tablet, Rfl: 0  traZODone (DESYREL) 50 MG tablet, Take 50 mg by mouth every evening., Disp: , Rfl:     Side effects of current treatment: denies    Symptoms related: well controlled  Panic attacks: denies    Hopelessness: denies  Sleep: controlled with trazodone  Suicidal thoughts: denies  Thoughts of self harm:denies  Thoughts of harm to others: denies   History of suicide  attempts: denies  Family history of suicide:denies      HEALTH MAINTENANCE:   Health Maintenance   Topic Date Due    TETANUS VACCINE  04/16/2017    DEXA SCAN  09/18/2020    Lipid Panel  09/23/2024    Pneumococcal Vaccine (65+ Low/Medium Risk)  Completed       ROS: Review of Systems   Constitutional: Negative.    HENT: Negative.    Eyes: Negative.    Respiratory: Negative.    Cardiovascular: Negative.    Gastrointestinal: Negative.    Endocrine: Negative.    Genitourinary: Negative.    Musculoskeletal: Positive for arthralgias and back pain.        Otherwise negative   Skin: Negative.    Allergic/Immunologic: Negative.    Neurological: Negative.    Hematological: Negative.    Psychiatric/Behavioral: Negative.        ALLERGIES:   Review of patient's allergies indicates:   Allergen Reactions    Anesthetics - amide type      Patient unsure of med - unable to be awakened.    Patient has tolerated topical local anesthetic for cataract procedure without issues    Anesthetics - jordan type- parabens      Patient unsure of med - unable to be awakened.    Patient has tolerated topical local anesthetic for cataract procedure without issues       MEDS:     Current Outpatient Medications:     atorvastatin (LIPITOR) 20 MG tablet, Take 1 tablet (20 mg total) by mouth once daily., Disp: 90 tablet, Rfl: 3    fluocinonide 0.05% (LIDEX) 0.05 % cream, Apply topically 2 (two) times daily as needed., Disp: 180 g, Rfl: 0    fluticasone propionate (FLONASE) 50 mcg/actuation nasal spray, 2 sprays (100 mcg total) by Each Nostril route once daily., Disp: 6 Bottle, Rfl: 1    furosemide (LASIX) 20 MG tablet, Take 20 mg by mouth 2 (two) times daily., Disp: , Rfl:     lisinopriL (PRINIVIL,ZESTRIL) 5 MG tablet, Take 5 mg by mouth once daily., Disp: , Rfl:     metoprolol succinate (TOPROL XL) 25 MG 24 hr tablet, Take 25 mg by mouth once daily., Disp: , Rfl:     montelukast (SINGULAIR) 10 mg tablet, Take 1 tablet (10 mg total) by mouth  "every evening., Disp: 90 tablet, Rfl: 1    omeprazole (PRILOSEC) 20 MG capsule, Take 1 capsule (20 mg total) by mouth once daily., Disp: 90 capsule, Rfl: 3    paroxetine (PAXIL) 10 MG tablet, Take 1 tablet (10 mg total) by mouth every morning., Disp: 90 tablet, Rfl: 1    rivaroxaban (XARELTO) 20 mg Tab, Take 20 mg by mouth daily with dinner or evening meal., Disp: , Rfl:     traZODone (DESYREL) 50 MG tablet, Take 50 mg by mouth every evening., Disp: , Rfl:     lisinopril (PRINIVIL,ZESTRIL) 5 MG tablet, Take 1 tablet (5 mg total) by mouth once daily., Disp: 90 tablet, Rfl: 3    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    GERD (gastroesophageal reflux disease)     hiatel hernia present    Hypertension     Osteoporosis     Outbursts of explosive behavior 10/24/2013    Sinus congestion     Stroke        Past Surgical History:   Procedure Laterality Date    CATARACT LEFT EYE Left     EYE SURGERY Left     cataract    HERNIA REPAIR      HYSTERECTOMY      repair of aneuysm      varicose veins         Family History   Problem Relation Age of Onset    Stroke Mother     Cancer Father     Stroke Sister     Amblyopia Neg Hx     Blindness Neg Hx     Cataracts Neg Hx     Diabetes Neg Hx     Glaucoma Neg Hx     Hypertension Neg Hx     Macular degeneration Neg Hx     Retinal detachment Neg Hx     Strabismus Neg Hx     Thyroid disease Neg Hx        Social History     Tobacco Use    Smoking status: Former Smoker     Packs/day: 0.50     Years: 14.00     Pack years: 7.00     Quit date: 1999     Years since quittin.1    Smokeless tobacco: Never Used   Substance Use Topics    Alcohol use: No    Drug use: No       Social History     Social History Narrative     and lives alone. Good family support with " "3 sons and 2 daughters. Does not drive. DME: cane.        OBJECTIVE:   Vitals:    06/24/20 0749   BP: 122/70   Pulse: 77   Temp: 97.6 °F (36.4 °C)   TempSrc: Oral   SpO2: 95%   Weight: 63.5 kg (140 lb 1.6 oz)   Height: 5' 4" (1.626 m)     Body mass index is 24.05 kg/m².    Physical Exam  Constitutional:       General: She is not in acute distress.  Neck:      Musculoskeletal: Neck supple.      Thyroid: No thyromegaly.      Vascular: No carotid bruit.   Cardiovascular:      Rate and Rhythm: Normal rate and regular rhythm.      Heart sounds: Murmur present. No friction rub. No gallop.    Pulmonary:      Effort: Pulmonary effort is normal.      Breath sounds: Normal breath sounds. No decreased breath sounds, wheezing, rhonchi or rales.   Abdominal:      General: Abdomen is flat. Bowel sounds are normal. There is no distension.      Palpations: Abdomen is soft. There is no hepatomegaly or mass.      Tenderness: There is no abdominal tenderness.   Musculoskeletal:      Right lower leg: No edema.      Left lower leg: No edema.   Lymphadenopathy:      Cervical: No cervical adenopathy.   Skin:     General: Skin is warm.      Capillary Refill: Capillary refill takes less than 2 seconds.   Neurological:      Mental Status: She is alert.           Depression Patient Health Questionnaire 6/24/2020 9/17/2019 9/17/2019 9/17/2019 1/18/2017 1/19/2016 1/16/2015   Over the last two weeks how often have you been bothered by little interest or pleasure in doing things 0 0 0 0 0 0 0   Over the last two weeks how often have you been bothered by feeling down, depressed or hopeless 0 0 0 0 0 0 0   PHQ-2 Total Score 0 0 0 0 0 0 0       PERTINENT RESULTS:     [phone]  [phone] 9/18/2017       Narrative & Impression     BONE MINERAL DENSITY     INDICATION: Menopause.     COMPARISONS: None.     TECHNICAL PARAMETERS: Bone mineral density was obtained using dual energy x-ray absorptiometry using the [employer] by [employer].      "   FINDINGS:     LUMBAR SPINE :       BMD:  1.415 G/cm2.    T-Score:  2.0 SD  Z-Score:  3.8 SD     LEFT HIP (total hip)  BMD:  0.988 g/cm2.    T-Score:  -0.2 SD  Z-Score:  1.8 SD     LEFT HIP (femoral neck)  BMD:  1.018 g/cm2.    T-Score:  -0.1 SD  Z-Score:  1.9 SD     RIGHT HIP (total hip)  BMD:  0.991 g/cm2.    T-Score:  -0.1 SD  Z-Score:  1.8 SD     RIGHT HIP (femoral neck)  BMD:  0.935 g/cm2.    T-Score:  -0.7 SD  Z-Score:  1.3 SD     T-score is standard deviation  from the expected peak bone mineral density of the normal young adult (20 to 45 year old) [place]  female reference population. Z-score is standard deviations from the expected peak bone mass matched for age, sex, weight and ethnicity.  IMPRESSION:        Normal bone mineral density.      FOLLOW-UP RECOMMENDATIONS: Screening DEXA BMD testing in two years is recommended, as clinically indicated.  However, when monitoring for rapid bone loss, repeat scanning as frequently as every 6 to 12 months may be desirable or necessary.  (Please note that sequential exams to assess change or best on the same machine each time in order to preserve comparison accuracy.)     WORLD HEALTH ORGANIZATION CRITERIA: (For postmenopausal > 60-year-old  women, no established criteria another populations).  NORMAL: T-score> -1 SD      OSTEOPENIA: T-score  -1 to -2.5 SD     OSTEOPOROSIS:  T-score <-2.5 SD        Electronically signed by: ELLEN YAP MD  Date:                                            09/18/17  Time:                                           11:49          BMP  Lab Results   Component Value Date     06/24/2020    K 4.7 06/24/2020     06/24/2020    CO2 28 06/24/2020    BUN 18 (H) 06/24/2020    CREATININE 1.19 06/24/2020    CALCIUM 9.1 06/24/2020    ANIONGAP 5 (L) 06/24/2020    ESTGFRAFRICA 49.5 (A) 06/24/2020    EGFRNONAA 42.9 (A) 06/24/2020     Lab Results   Component Value Date    ALT 15 09/23/2019    AST 30 09/23/2019    ALKPHOS 87  09/23/2019    BILITOT 0.8 09/23/2019     Lab Results   Component Value Date    CHOL 101 (L) 09/23/2019    CHOL 123 09/14/2017    CHOL 186 08/20/2016     Lab Results   Component Value Date    HDL 43 09/23/2019    HDL 45 09/14/2017    HDL 40 08/20/2016     Lab Results   Component Value Date    LDLCALC 42.4 (L) 09/23/2019    LDLCALC 62.6 (L) 09/14/2017    LDLCALC 116.2 08/20/2016     Lab Results   Component Value Date    TRIG 78 09/23/2019    TRIG 77 09/14/2017    TRIG 149 08/20/2016     Lab Results   Component Value Date    CHOLHDL 42.6 09/23/2019    CHOLHDL 36.6 09/14/2017    CHOLHDL 21.5 08/20/2016     Lab Results   Component Value Date    WBC 5.50 06/24/2020    HGB 11.4 (L) 06/24/2020    HCT 36.9 (L) 06/24/2020     (H) 06/24/2020     06/24/2020       Lab Results   Component Value Date    TSH 3.490 08/20/2016    FREET4 0.86 01/26/2010         ASSESSMENT/PLAN:  Problem List Items Addressed This Visit        Neuro    History of TIA (transient ischemic attack)    Overview     - goal LDL <70 on statin  - BP goal < 130/80  - ASA  81 mg daily            Psychiatric    Generalized anxiety disorder    Relevant Medications    paroxetine (PAXIL) 10 MG tablet       ENT    Chronic rhinitis    Current Assessment & Plan     - well controlled  - continue current medication         Relevant Medications    montelukast (SINGULAIR) 10 mg tablet       Cardiac/Vascular    Chronic diastolic heart failure    Overview     - eEFHF  - followed by Dr. Conner  - ACEI, BBB and lasix  - no signs of decompensation         Relevant Orders    Comprehensive metabolic panel    Lipid Panel    CBC auto differential    Essential hypertension - Primary    Current Assessment & Plan     - well controlled  - continue current medications         Relevant Orders    Comprehensive metabolic panel    Lipid Panel    Hyperlipidemia    Current Assessment & Plan     Lab Results   Component Value Date    LDLCALC 42.4 (L) 09/23/2019   - well controlled  -  continue current medication         Relevant Medications    atorvastatin (LIPITOR) 20 MG tablet    Other Relevant Orders    Comprehensive metabolic panel    Lipid Panel    Paroxysmal atrial fibrillation    Overview     - followed by Dr. Conner  - rate controlled  - OAC Xarelto         Relevant Orders    Comprehensive metabolic panel    Lipid Panel       Hematology    Current use of long term anticoagulation    Overview     - followed by Dr. Conner  - pAfib with h/o TIA  - history of AAA repair         Relevant Orders    CBC auto differential       GI    Gastroesophageal reflux disease without esophagitis    Current Assessment & Plan     - well controlled  - continue current medication         Relevant Medications    omeprazole (PRILOSEC) 20 MG capsule       Orthopedic    Primary osteoarthritis involving multiple joints    Current Assessment & Plan     - recommend stop meloxicam due to renal function and pt agreeable  - reports able to control pain with APAP               ORDERS:   Orders Placed This Encounter    Comprehensive metabolic panel    Lipid Panel    CBC auto differential    atorvastatin (LIPITOR) 20 MG tablet    paroxetine (PAXIL) 10 MG tablet    omeprazole (PRILOSEC) 20 MG capsule    montelukast (SINGULAIR) 10 mg tablet       Vaccines recommended: Tdap (new grandchildren) and Shingles. Pt declined    Follow-up in  6 months with labs or sooner if any concerns.    Dr. Elsy Jacinto D.O.   Family Medicine

## 2020-06-24 ENCOUNTER — OFFICE VISIT (OUTPATIENT)
Dept: FAMILY MEDICINE | Facility: CLINIC | Age: 80
End: 2020-06-24
Payer: MEDICARE

## 2020-06-24 VITALS
HEART RATE: 77 BPM | DIASTOLIC BLOOD PRESSURE: 70 MMHG | TEMPERATURE: 98 F | SYSTOLIC BLOOD PRESSURE: 122 MMHG | BODY MASS INDEX: 23.92 KG/M2 | WEIGHT: 140.13 LBS | HEIGHT: 64 IN | OXYGEN SATURATION: 95 %

## 2020-06-24 DIAGNOSIS — Z79.01 CURRENT USE OF LONG TERM ANTICOAGULATION: ICD-10-CM

## 2020-06-24 DIAGNOSIS — I48.0 PAROXYSMAL ATRIAL FIBRILLATION: ICD-10-CM

## 2020-06-24 DIAGNOSIS — E78.00 PURE HYPERCHOLESTEROLEMIA: ICD-10-CM

## 2020-06-24 DIAGNOSIS — I50.32 CHRONIC DIASTOLIC HEART FAILURE: ICD-10-CM

## 2020-06-24 DIAGNOSIS — F41.1 GENERALIZED ANXIETY DISORDER: ICD-10-CM

## 2020-06-24 DIAGNOSIS — I10 ESSENTIAL HYPERTENSION: Primary | ICD-10-CM

## 2020-06-24 DIAGNOSIS — K21.9 GASTROESOPHAGEAL REFLUX DISEASE WITHOUT ESOPHAGITIS: ICD-10-CM

## 2020-06-24 DIAGNOSIS — M15.9 PRIMARY OSTEOARTHRITIS INVOLVING MULTIPLE JOINTS: ICD-10-CM

## 2020-06-24 DIAGNOSIS — Z86.73 HISTORY OF TIA (TRANSIENT ISCHEMIC ATTACK): ICD-10-CM

## 2020-06-24 DIAGNOSIS — J31.0 CHRONIC RHINITIS: ICD-10-CM

## 2020-06-24 PROCEDURE — 99214 OFFICE O/P EST MOD 30 MIN: CPT | Mod: S$PBB,,, | Performed by: FAMILY MEDICINE

## 2020-06-24 PROCEDURE — 99999 PR PBB SHADOW E&M-EST. PATIENT-LVL IV: ICD-10-PCS | Mod: PBBFAC,,, | Performed by: FAMILY MEDICINE

## 2020-06-24 PROCEDURE — 99214 PR OFFICE/OUTPT VISIT, EST, LEVL IV, 30-39 MIN: ICD-10-PCS | Mod: S$PBB,,, | Performed by: FAMILY MEDICINE

## 2020-06-24 PROCEDURE — 99999 PR PBB SHADOW E&M-EST. PATIENT-LVL IV: CPT | Mod: PBBFAC,,, | Performed by: FAMILY MEDICINE

## 2020-06-24 PROCEDURE — 99214 OFFICE O/P EST MOD 30 MIN: CPT | Mod: PBBFAC,PN | Performed by: FAMILY MEDICINE

## 2020-06-24 RX ORDER — MELOXICAM 7.5 MG/1
7.5 TABLET ORAL DAILY PRN
Qty: 90 TABLET | Refills: 1 | Status: CANCELLED | OUTPATIENT
Start: 2020-06-24

## 2020-06-24 RX ORDER — FUROSEMIDE 20 MG/1
20 TABLET ORAL 2 TIMES DAILY
Qty: 180 TABLET | Refills: 1 | Status: CANCELLED | OUTPATIENT
Start: 2020-06-24

## 2020-06-24 RX ORDER — ATORVASTATIN CALCIUM 20 MG/1
20 TABLET, FILM COATED ORAL DAILY
Qty: 90 TABLET | Refills: 3 | Status: SHIPPED | OUTPATIENT
Start: 2020-06-24 | End: 2021-05-03 | Stop reason: SDUPTHER

## 2020-06-24 RX ORDER — PAROXETINE 10 MG/1
10 TABLET, FILM COATED ORAL EVERY MORNING
Qty: 90 TABLET | Refills: 1 | Status: SHIPPED | OUTPATIENT
Start: 2020-06-24 | End: 2020-12-03 | Stop reason: SDUPTHER

## 2020-06-24 RX ORDER — TRAZODONE HYDROCHLORIDE 50 MG/1
50 TABLET ORAL NIGHTLY
Qty: 90 TABLET | Refills: 1 | Status: CANCELLED | OUTPATIENT
Start: 2020-06-24

## 2020-06-24 RX ORDER — LISINOPRIL 5 MG/1
5 TABLET ORAL DAILY
Status: ON HOLD | COMMUNITY
End: 2020-11-25

## 2020-06-24 RX ORDER — MONTELUKAST SODIUM 10 MG/1
10 TABLET ORAL NIGHTLY
Qty: 90 TABLET | Refills: 1 | Status: SHIPPED | OUTPATIENT
Start: 2020-06-24 | End: 2020-12-03 | Stop reason: SDUPTHER

## 2020-06-24 RX ORDER — OMEPRAZOLE 20 MG/1
20 CAPSULE, DELAYED RELEASE ORAL DAILY
Qty: 90 CAPSULE | Refills: 3 | Status: SHIPPED | OUTPATIENT
Start: 2020-06-24 | End: 2021-05-03 | Stop reason: SDUPTHER

## 2020-06-24 RX ORDER — METOPROLOL SUCCINATE 25 MG/1
25 TABLET, EXTENDED RELEASE ORAL DAILY
Qty: 90 TABLET | Refills: 1 | Status: CANCELLED | OUTPATIENT
Start: 2020-06-24

## 2020-06-24 RX ORDER — ZOSTER VACCINE RECOMBINANT, ADJUVANTED 50 MCG/0.5
KIT INTRAMUSCULAR
Qty: 0.5 ML | Refills: 1 | Status: CANCELLED | OUTPATIENT
Start: 2020-06-24

## 2020-06-24 RX ORDER — LISINOPRIL 5 MG/1
5 TABLET ORAL DAILY
Qty: 90 TABLET | Refills: 3 | Status: CANCELLED | OUTPATIENT
Start: 2020-06-24

## 2020-06-24 NOTE — ASSESSMENT & PLAN NOTE
- recommend stop meloxicam due to renal function and pt agreeable  - reports able to control pain with APAP

## 2020-06-24 NOTE — ASSESSMENT & PLAN NOTE
Lab Results   Component Value Date    LDLCALC 42.4 (L) 09/23/2019   - well controlled  - continue current medication

## 2020-10-01 ENCOUNTER — PATIENT MESSAGE (OUTPATIENT)
Dept: OTHER | Facility: OTHER | Age: 80
End: 2020-10-01

## 2020-10-05 ENCOUNTER — TELEPHONE (OUTPATIENT)
Dept: FAMILY MEDICINE | Facility: CLINIC | Age: 80
End: 2020-10-05

## 2020-10-05 DIAGNOSIS — G89.4 CHRONIC PAIN SYNDROME: Primary | ICD-10-CM

## 2020-10-05 RX ORDER — GABAPENTIN 100 MG/1
100 CAPSULE ORAL 3 TIMES DAILY
Qty: 90 CAPSULE | Refills: 0 | Status: SHIPPED | OUTPATIENT
Start: 2020-10-05 | End: 2020-11-06

## 2020-10-05 NOTE — TELEPHONE ENCOUNTER
Pt is having leg pain, and was looking for some pain pills.    Pt's stated she has recently been takin off of the arthritis meds by Dr. Jacinto but has been having pains.     Pt lost her son and is making  arrangements so can not come in.

## 2020-10-05 NOTE — TELEPHONE ENCOUNTER
She cannot take anti-inflammatory due to kidney disease. She can try Gabapentin 100 mg three time a day to see if that help but warn that is does cause drowsiness. I sent to pharmacy. Okay for Acetaminophen 500 to 650 mg three times a day as needed. If not effective needs to be seen.   Dr. Elsy Jacinto D.O.   [facility]

## 2020-11-25 ENCOUNTER — HOSPITAL ENCOUNTER (INPATIENT)
Facility: HOSPITAL | Age: 80
LOS: 4 days | Discharge: HOME | DRG: 177 | End: 2020-11-29
Attending: EMERGENCY MEDICINE | Admitting: HOSPITALIST
Payer: MEDICARE

## 2020-11-25 DIAGNOSIS — I48.0 PAROXYSMAL ATRIAL FIBRILLATION: ICD-10-CM

## 2020-11-25 DIAGNOSIS — I50.9 CHF (CONGESTIVE HEART FAILURE): ICD-10-CM

## 2020-11-25 DIAGNOSIS — I50.32 CHRONIC DIASTOLIC HEART FAILURE: ICD-10-CM

## 2020-11-25 DIAGNOSIS — R10.9 ABDOMINAL PAIN: ICD-10-CM

## 2020-11-25 DIAGNOSIS — U07.1 COVID-19 VIRUS DETECTED: ICD-10-CM

## 2020-11-25 DIAGNOSIS — R06.02 SHORTNESS OF BREATH: ICD-10-CM

## 2020-11-25 DIAGNOSIS — Z79.01 CURRENT USE OF LONG TERM ANTICOAGULATION: ICD-10-CM

## 2020-11-25 DIAGNOSIS — U07.1 COVID-19 VIRUS INFECTION: Primary | ICD-10-CM

## 2020-11-25 DIAGNOSIS — N17.9 AKI (ACUTE KIDNEY INJURY): ICD-10-CM

## 2020-11-25 DIAGNOSIS — I10 ESSENTIAL HYPERTENSION: ICD-10-CM

## 2020-11-25 DIAGNOSIS — E78.2 MIXED HYPERLIPIDEMIA: ICD-10-CM

## 2020-11-25 PROBLEM — N39.0 URINARY TRACT INFECTION WITHOUT HEMATURIA: Status: ACTIVE | Noted: 2020-11-25

## 2020-11-25 LAB
25(OH)D3+25(OH)D2 SERPL-MCNC: 44 NG/ML (ref 30–96)
ALBUMIN SERPL BCP-MCNC: 3.4 G/DL (ref 3.5–5.2)
ALP SERPL-CCNC: 71 U/L (ref 55–135)
ALT SERPL W/O P-5'-P-CCNC: 12 U/L (ref 10–44)
ANION GAP SERPL CALC-SCNC: 9 MMOL/L (ref 8–16)
AST SERPL-CCNC: 21 U/L (ref 10–40)
BACTERIA #/AREA URNS HPF: ABNORMAL /HPF
BASOPHILS # BLD AUTO: 0.06 K/UL (ref 0–0.2)
BASOPHILS NFR BLD: 0.8 % (ref 0–1.9)
BILIRUB SERPL-MCNC: 1.1 MG/DL (ref 0.1–1)
BILIRUB UR QL STRIP: NEGATIVE
BNP SERPL-MCNC: 1077 PG/ML (ref 0–99)
BUN SERPL-MCNC: 19 MG/DL (ref 8–23)
CALCIUM SERPL-MCNC: 8.5 MG/DL (ref 8.7–10.5)
CHLORIDE SERPL-SCNC: 104 MMOL/L (ref 95–110)
CK SERPL-CCNC: 41 U/L (ref 20–180)
CLARITY UR: ABNORMAL
CO2 SERPL-SCNC: 23 MMOL/L (ref 23–29)
COLOR UR: YELLOW
CREAT SERPL-MCNC: 1.5 MG/DL (ref 0.5–1.4)
CRP SERPL-MCNC: 1.5 MG/L (ref 0–8.2)
D DIMER PPP IA.FEU-MCNC: 2.48 MG/L FEU
DIFFERENTIAL METHOD: ABNORMAL
EOSINOPHIL # BLD AUTO: 0.2 K/UL (ref 0–0.5)
EOSINOPHIL NFR BLD: 3 % (ref 0–8)
ERYTHROCYTE [DISTWIDTH] IN BLOOD BY AUTOMATED COUNT: 13.2 % (ref 11.5–14.5)
ERYTHROCYTE [SEDIMENTATION RATE] IN BLOOD BY WESTERGREN METHOD: 10 MM/HR (ref 0–20)
EST. GFR  (AFRICAN AMERICAN): 37 ML/MIN/1.73 M^2
EST. GFR  (NON AFRICAN AMERICAN): 32 ML/MIN/1.73 M^2
FERRITIN SERPL-MCNC: 78 NG/ML (ref 20–300)
FERRITIN SERPL-MCNC: 78 NG/ML (ref 20–300)
GLUCOSE SERPL-MCNC: 100 MG/DL (ref 70–110)
GLUCOSE UR QL STRIP: NEGATIVE
HCT VFR BLD AUTO: 31.6 % (ref 37–48.5)
HGB BLD-MCNC: 10.3 G/DL (ref 12–16)
HGB UR QL STRIP: NEGATIVE
IMM GRANULOCYTES # BLD AUTO: 0.03 K/UL (ref 0–0.04)
IMM GRANULOCYTES NFR BLD AUTO: 0.4 % (ref 0–0.5)
INR PPP: 1.5 (ref 0.8–1.2)
KETONES UR QL STRIP: NEGATIVE
LACTATE SERPL-SCNC: 1 MMOL/L (ref 0.5–2.2)
LEUKOCYTE ESTERASE UR QL STRIP: NEGATIVE
LIPASE SERPL-CCNC: 12 U/L (ref 4–60)
LYMPHOCYTES # BLD AUTO: 1.1 K/UL (ref 1–4.8)
LYMPHOCYTES NFR BLD: 15.3 % (ref 18–48)
MCH RBC QN AUTO: 31.4 PG (ref 27–31)
MCHC RBC AUTO-ENTMCNC: 32.6 G/DL (ref 32–36)
MCV RBC AUTO: 96 FL (ref 82–98)
MICROSCOPIC COMMENT: ABNORMAL
MONOCYTES # BLD AUTO: 0.7 K/UL (ref 0.3–1)
MONOCYTES NFR BLD: 10.2 % (ref 4–15)
NEUTROPHILS # BLD AUTO: 5.1 K/UL (ref 1.8–7.7)
NEUTROPHILS NFR BLD: 70.3 % (ref 38–73)
NITRITE UR QL STRIP: POSITIVE
NRBC BLD-RTO: 0 /100 WBC
PH UR STRIP: 6 [PH] (ref 5–8)
PLATELET # BLD AUTO: 167 K/UL (ref 150–350)
PMV BLD AUTO: 10.4 FL (ref 9.2–12.9)
POTASSIUM SERPL-SCNC: 4.9 MMOL/L (ref 3.5–5.1)
PROCALCITONIN SERPL IA-MCNC: <0.02 NG/ML
PROT SERPL-MCNC: 6.5 G/DL (ref 6–8.4)
PROT UR QL STRIP: NEGATIVE
PROTHROMBIN TIME: 15.2 SEC (ref 9–12.5)
RBC # BLD AUTO: 3.28 M/UL (ref 4–5.4)
SARS-COV-2 RDRP RESP QL NAA+PROBE: POSITIVE
SODIUM SERPL-SCNC: 136 MMOL/L (ref 136–145)
SP GR UR STRIP: 1 (ref 1–1.03)
TROPONIN I SERPL DL<=0.01 NG/ML-MCNC: 0.01 NG/ML (ref 0–0.03)
URN SPEC COLLECT METH UR: ABNORMAL
UROBILINOGEN UR STRIP-ACNC: NEGATIVE EU/DL
WBC # BLD AUTO: 7.27 K/UL (ref 3.9–12.7)
WBC #/AREA URNS HPF: 15 /HPF (ref 0–5)

## 2020-11-25 PROCEDURE — 25000003 PHARM REV CODE 250: Performed by: HOSPITALIST

## 2020-11-25 PROCEDURE — 63600175 PHARM REV CODE 636 W HCPCS: Performed by: HOSPITALIST

## 2020-11-25 PROCEDURE — 25000003 PHARM REV CODE 250: Performed by: EMERGENCY MEDICINE

## 2020-11-25 PROCEDURE — 85379 FIBRIN DEGRADATION QUANT: CPT

## 2020-11-25 PROCEDURE — 82306 VITAMIN D 25 HYDROXY: CPT

## 2020-11-25 PROCEDURE — 83605 ASSAY OF LACTIC ACID: CPT

## 2020-11-25 PROCEDURE — 85652 RBC SED RATE AUTOMATED: CPT

## 2020-11-25 PROCEDURE — 80053 COMPREHEN METABOLIC PANEL: CPT

## 2020-11-25 PROCEDURE — 82728 ASSAY OF FERRITIN: CPT

## 2020-11-25 PROCEDURE — 11000001 HC ACUTE MED/SURG PRIVATE ROOM

## 2020-11-25 PROCEDURE — 83690 ASSAY OF LIPASE: CPT

## 2020-11-25 PROCEDURE — U0002 COVID-19 LAB TEST NON-CDC: HCPCS

## 2020-11-25 PROCEDURE — 63600175 PHARM REV CODE 636 W HCPCS: Performed by: EMERGENCY MEDICINE

## 2020-11-25 PROCEDURE — 81000 URINALYSIS NONAUTO W/SCOPE: CPT

## 2020-11-25 PROCEDURE — 87186 SC STD MICRODIL/AGAR DIL: CPT

## 2020-11-25 PROCEDURE — 96367 TX/PROPH/DG ADDL SEQ IV INF: CPT | Performed by: EMERGENCY MEDICINE

## 2020-11-25 PROCEDURE — 96365 THER/PROPH/DIAG IV INF INIT: CPT

## 2020-11-25 PROCEDURE — 87040 BLOOD CULTURE FOR BACTERIA: CPT | Mod: 59

## 2020-11-25 PROCEDURE — 82550 ASSAY OF CK (CPK): CPT

## 2020-11-25 PROCEDURE — 87086 URINE CULTURE/COLONY COUNT: CPT

## 2020-11-25 PROCEDURE — 87077 CULTURE AEROBIC IDENTIFY: CPT

## 2020-11-25 PROCEDURE — 93005 ELECTROCARDIOGRAM TRACING: CPT

## 2020-11-25 PROCEDURE — 93010 ELECTROCARDIOGRAM REPORT: CPT | Mod: ,,, | Performed by: INTERNAL MEDICINE

## 2020-11-25 PROCEDURE — 85025 COMPLETE CBC W/AUTO DIFF WBC: CPT

## 2020-11-25 PROCEDURE — 93010 EKG 12-LEAD: ICD-10-PCS | Mod: ,,, | Performed by: INTERNAL MEDICINE

## 2020-11-25 PROCEDURE — 84484 ASSAY OF TROPONIN QUANT: CPT

## 2020-11-25 PROCEDURE — 85610 PROTHROMBIN TIME: CPT

## 2020-11-25 PROCEDURE — 83880 ASSAY OF NATRIURETIC PEPTIDE: CPT

## 2020-11-25 PROCEDURE — 86140 C-REACTIVE PROTEIN: CPT

## 2020-11-25 PROCEDURE — 87088 URINE BACTERIA CULTURE: CPT

## 2020-11-25 PROCEDURE — 84145 PROCALCITONIN (PCT): CPT

## 2020-11-25 PROCEDURE — 99285 EMERGENCY DEPT VISIT HI MDM: CPT | Mod: 25

## 2020-11-25 RX ORDER — MONTELUKAST SODIUM 10 MG/1
10 TABLET ORAL NIGHTLY
Status: DISCONTINUED | OUTPATIENT
Start: 2020-11-25 | End: 2020-11-29 | Stop reason: HOSPADM

## 2020-11-25 RX ORDER — ATORVASTATIN CALCIUM 20 MG/1
20 TABLET, FILM COATED ORAL DAILY
Status: DISCONTINUED | OUTPATIENT
Start: 2020-11-25 | End: 2020-11-29 | Stop reason: HOSPADM

## 2020-11-25 RX ORDER — ONDANSETRON 2 MG/ML
4 INJECTION INTRAMUSCULAR; INTRAVENOUS EVERY 8 HOURS PRN
Status: DISCONTINUED | OUTPATIENT
Start: 2020-11-25 | End: 2020-11-29 | Stop reason: HOSPADM

## 2020-11-25 RX ORDER — GABAPENTIN 100 MG/1
100 CAPSULE ORAL 3 TIMES DAILY
Status: DISCONTINUED | OUTPATIENT
Start: 2020-11-25 | End: 2020-11-29 | Stop reason: HOSPADM

## 2020-11-25 RX ORDER — GLUCAGON 1 MG
1 KIT INJECTION
Status: DISCONTINUED | OUTPATIENT
Start: 2020-11-25 | End: 2020-11-29 | Stop reason: HOSPADM

## 2020-11-25 RX ORDER — SODIUM CHLORIDE 0.9 % (FLUSH) 0.9 %
10 SYRINGE (ML) INJECTION
Status: DISCONTINUED | OUTPATIENT
Start: 2020-11-25 | End: 2020-11-29 | Stop reason: HOSPADM

## 2020-11-25 RX ORDER — METOPROLOL SUCCINATE 25 MG/1
25 TABLET, EXTENDED RELEASE ORAL DAILY
Status: DISCONTINUED | OUTPATIENT
Start: 2020-11-25 | End: 2020-11-29 | Stop reason: HOSPADM

## 2020-11-25 RX ORDER — FUROSEMIDE 20 MG/1
20 TABLET ORAL 2 TIMES DAILY
Status: DISCONTINUED | OUTPATIENT
Start: 2020-11-25 | End: 2020-11-25

## 2020-11-25 RX ORDER — PAROXETINE 10 MG/1
10 TABLET, FILM COATED ORAL EVERY MORNING
Status: DISCONTINUED | OUTPATIENT
Start: 2020-11-26 | End: 2020-11-29 | Stop reason: HOSPADM

## 2020-11-25 RX ORDER — GUAIFENESIN/DEXTROMETHORPHAN 100-10MG/5
10 SYRUP ORAL EVERY 4 HOURS PRN
Status: DISCONTINUED | OUTPATIENT
Start: 2020-11-25 | End: 2020-11-29 | Stop reason: HOSPADM

## 2020-11-25 RX ORDER — AZITHROMYCIN 250 MG/1
250 TABLET, FILM COATED ORAL DAILY
Status: DISCONTINUED | OUTPATIENT
Start: 2020-11-26 | End: 2020-11-29 | Stop reason: HOSPADM

## 2020-11-25 RX ORDER — ASCORBIC ACID 500 MG
500 TABLET ORAL 2 TIMES DAILY
Status: DISCONTINUED | OUTPATIENT
Start: 2020-11-25 | End: 2020-11-29 | Stop reason: HOSPADM

## 2020-11-25 RX ORDER — TRAZODONE HYDROCHLORIDE 50 MG/1
50 TABLET ORAL NIGHTLY
Status: DISCONTINUED | OUTPATIENT
Start: 2020-11-25 | End: 2020-11-29 | Stop reason: HOSPADM

## 2020-11-25 RX ORDER — ACETAMINOPHEN 325 MG/1
650 TABLET ORAL EVERY 4 HOURS PRN
Status: DISCONTINUED | OUTPATIENT
Start: 2020-11-25 | End: 2020-11-29 | Stop reason: HOSPADM

## 2020-11-25 RX ORDER — IBUPROFEN 200 MG
16 TABLET ORAL
Status: DISCONTINUED | OUTPATIENT
Start: 2020-11-25 | End: 2020-11-29 | Stop reason: HOSPADM

## 2020-11-25 RX ORDER — PANTOPRAZOLE SODIUM 40 MG/1
40 TABLET, DELAYED RELEASE ORAL DAILY
Status: DISCONTINUED | OUTPATIENT
Start: 2020-11-25 | End: 2020-11-29 | Stop reason: HOSPADM

## 2020-11-25 RX ORDER — IBUPROFEN 200 MG
24 TABLET ORAL
Status: DISCONTINUED | OUTPATIENT
Start: 2020-11-25 | End: 2020-11-29 | Stop reason: HOSPADM

## 2020-11-25 RX ORDER — CHOLECALCIFEROL (VITAMIN D3) 25 MCG
1000 TABLET ORAL DAILY
Status: DISCONTINUED | OUTPATIENT
Start: 2020-11-25 | End: 2020-11-29 | Stop reason: HOSPADM

## 2020-11-25 RX ORDER — HYDROCODONE BITARTRATE AND ACETAMINOPHEN 5; 325 MG/1; MG/1
1 TABLET ORAL EVERY 6 HOURS PRN
Status: DISCONTINUED | OUTPATIENT
Start: 2020-11-25 | End: 2020-11-29 | Stop reason: HOSPADM

## 2020-11-25 RX ADMIN — OXYCODONE HYDROCHLORIDE AND ACETAMINOPHEN 500 MG: 500 TABLET ORAL at 10:11

## 2020-11-25 RX ADMIN — DEXAMETHASONE 6 MG: 4 TABLET ORAL at 05:11

## 2020-11-25 RX ADMIN — CEFTRIAXONE SODIUM 1 G: 1 INJECTION, POWDER, FOR SOLUTION INTRAMUSCULAR; INTRAVENOUS at 01:11

## 2020-11-25 RX ADMIN — AZITHROMYCIN MONOHYDRATE 500 MG: 500 INJECTION, POWDER, LYOPHILIZED, FOR SOLUTION INTRAVENOUS at 02:11

## 2020-11-25 RX ADMIN — REMDESIVIR 200 MG: 100 INJECTION, POWDER, LYOPHILIZED, FOR SOLUTION INTRAVENOUS at 05:11

## 2020-11-25 RX ADMIN — TRAZODONE HYDROCHLORIDE 50 MG: 50 TABLET ORAL at 10:11

## 2020-11-25 RX ADMIN — MONTELUKAST 10 MG: 10 TABLET, FILM COATED ORAL at 10:11

## 2020-11-25 RX ADMIN — GABAPENTIN 100 MG: 100 CAPSULE ORAL at 10:11

## 2020-11-25 RX ADMIN — RIVAROXABAN 20 MG: 20 TABLET, FILM COATED ORAL at 05:11

## 2020-11-25 NOTE — ED PROVIDER NOTES
"Encounter Date: 11/25/2020    SCRIBE #1 NOTE: I, Lidia Briseyda, am scribing for, and in the presence of, Dr. Bell.       History     Chief Complaint   Patient presents with    Abdominal Pain     lower abd pain last began last night    Headache     frontal HA x 3 days, denies vision change     Shortness of Breath     that began 2 days ago, pt states she gets weak with walking      AAA and paroxysmal afib, heart failure     This is an 79 y/o female with a pmhx of paroxysmal a-fib, chronic diastolic HF who presents to the ED with complaint of SOB. Patient notes she was sleeping last night when she was woken up due to SOB. Associated symptoms include CP that is non-radiating in nature. She notes symptoms were worsened by lying flat on her back and improved when sitting up in bed. She also notes lower abdominal pain but thinks this is due to her "leaking abdominal aorta." She is having a stent placed on Dec 8th. She notes having 5 BMs this morning which were normal in nature however, she does not usually have that many.     Patient also complains of headache to the right area of the back of her head. She notes associated chronic neck pain that is worsened by this headache. Onset began 3 days ago and has gradually worsened since. She took Tylenol at home with relief. She does not typically get headaches. She denies blurry vision, double vision or any other symptoms. Patient reports having cataract surgery in the past.     Patient endorses a mild cough and decreased appetite. She notes having Covid on 11/3/20 which is now resolved. Patient denies fever or any other symptoms at this time.     Pt reports compliance with her Xarelto, Metoprolol and Lasix 20mg BID     The history is provided by the patient. No  was used.     Review of patient's allergies indicates:   Allergen Reactions    Anesthetics - amide type      Patient unsure of med - unable to be awakened.    Patient has tolerated topical " "local anesthetic for cataract procedure without issues    Anesthetics - jordan type- parabens      Patient unsure of med - unable to be awakened.    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    GERD (gastroesophageal reflux disease)     hiatel hernia present    Hypertension     Osteoporosis     Outbursts of explosive behavior 10/24/2013    PAF (paroxysmal atrial fibrillation)     Sinus congestion     Stroke      Past Surgical History:   Procedure Laterality Date    CATARACT LEFT EYE Left     EYE SURGERY Left     cataract    HERNIA REPAIR      HYSTERECTOMY      repair of aneuysm      varicose veins       Family History   Problem Relation Age of Onset    Stroke Mother     Cancer Father     Stroke Sister     Amblyopia Neg Hx     Blindness Neg Hx     Cataracts Neg Hx     Diabetes Neg Hx     Glaucoma Neg Hx     Hypertension Neg Hx     Macular degeneration Neg Hx     Retinal detachment Neg Hx     Strabismus Neg Hx     Thyroid disease Neg Hx      Social History     Tobacco Use    Smoking status: Former Smoker     Packs/day: 0.50     Years: 14.00     Pack years: 7.00     Quit date: 1999     Years since quittin.6    Smokeless tobacco: Never Used   Substance Use Topics    Alcohol use: No    Drug use: No     Review of Systems   Constitutional: Negative for chills and fever.   HENT: Negative for congestion, rhinorrhea and sore throat.    Eyes: Negative for redness.   Respiratory: Positive for cough and shortness of breath.    Cardiovascular: Positive for chest pain. Negative for palpitations.   Gastrointestinal: Positive for abdominal pain. Negative for diarrhea, nausea and " vomiting.   Genitourinary: Negative for dysuria and hematuria.   Musculoskeletal: Negative for back pain.   Skin: Negative for rash.   Neurological: Positive for headaches. Negative for weakness and numbness.       Physical Exam     Initial Vitals [11/25/20 0908]   BP Pulse Resp Temp SpO2   (!) 137/90 75 17 97.8 °F (36.6 °C) (!) 94 %      MAP       --         Physical Exam    Nursing note and vitals reviewed.  Constitutional: She appears well-developed and well-nourished. She is not diaphoretic. No distress.   HENT:   Head: Normocephalic and atraumatic.   Eyes: Conjunctivae and EOM are normal. Pupils are equal, round, and reactive to light.   No nystagmus; no abnormal eye movements noted on exam   Neck: Normal range of motion. Neck supple.   Patient able range neck in all directions without significant limitations; patient able to touch chin to chest   Cardiovascular: Normal rate and regular rhythm.   Murmur heard.  Pulmonary/Chest: Breath sounds normal. No respiratory distress. She has no wheezes.   Abdominal: Soft. Bowel sounds are normal. She exhibits no distension and no mass. There is no abdominal tenderness. There is no rebound and no guarding.   Musculoskeletal: Normal range of motion. No tenderness or edema.   Neurological: She is alert and oriented to person, place, and time. She has normal strength.   Skin: Skin is warm and dry. Capillary refill takes less than 2 seconds.   Psychiatric: She has a normal mood and affect.         ED Course   Procedures  Labs Reviewed   CBC W/ AUTO DIFFERENTIAL - Abnormal; Notable for the following components:       Result Value    RBC 3.28 (*)     Hemoglobin 10.3 (*)     Hematocrit 31.6 (*)     MCH 31.4 (*)     Lymph % 15.3 (*)     All other components within normal limits   COMPREHENSIVE METABOLIC PANEL - Abnormal; Notable for the following components:    Creatinine 1.5 (*)     Calcium 8.5 (*)     Albumin 3.4 (*)     Total Bilirubin 1.1 (*)     eGFR if  37  (*)     eGFR if non  32 (*)     All other components within normal limits   URINALYSIS, REFLEX TO URINE CULTURE - Abnormal; Notable for the following components:    Appearance, UA Hazy (*)     Nitrite, UA Positive (*)     All other components within normal limits    Narrative:     Specimen Source->Urine   B-TYPE NATRIURETIC PEPTIDE - Abnormal; Notable for the following components:    BNP 1,077 (*)     All other components within normal limits   SARS-COV-2 RNA AMPLIFICATION, QUAL - Abnormal; Notable for the following components:    SARS-CoV-2 RNA, Amplification, Qual Positive (*)     All other components within normal limits    Narrative:      Covid  result(s) called and verbal readback obtained from Rebekah Esteban Rn by AT5 11/25/2020 10:41   URINALYSIS MICROSCOPIC - Abnormal; Notable for the following components:    WBC, UA 15 (*)     Bacteria Many (*)     All other components within normal limits    Narrative:     Specimen Source->Urine   D DIMER, QUANTITATIVE - Abnormal; Notable for the following components:    D-Dimer 2.48 (*)     All other components within normal limits   PROTIME-INR - Abnormal; Notable for the following components:    Prothrombin Time 15.2 (*)     INR 1.5 (*)     All other components within normal limits   CULTURE, URINE   CULTURE, BLOOD   CULTURE, BLOOD   LACTIC ACID, PLASMA   LIPASE   TROPONIN I   C-REACTIVE PROTEIN   SEDIMENTATION RATE   FERRITIN   PROCALCITONIN   CK   FERRITIN   VITAMIN D   LACTATE DEHYDROGENASE     EKG Readings: (Independently Interpreted)   Heart Rate: 67.   A fib  Rate 67   Low voltage QRS   No ST elevation or depression  No STEMI      ECG Results          EKG 12-lead (Final result)  Result time 11/25/20 15:38:42    Final result by Interface, Lab In [facility] (11/25/20 15:38:42)                 Narrative:    Test Reason : R10.9,    Vent. Rate : 067 BPM     Atrial Rate : 057 BPM     P-R Int : 000 ms          QRS Dur : 072 ms      QT Int : 418 ms        P-R-T Axes : 000 085 265 degrees     QTc Int : 441 ms    Atrial fibrillation  Low voltage QRS  Septal infarct ,age undetermined  Nonspecific T wave abnormality  Abnormal ECG  When compared with ECG of 23-OCT-2013 12:38,  Atrial fibrillation has replaced Sinus rhythm  Septal infarct is now Present  Criteria for Inferior infarct are no longer Present  Confirmed by Kimani Jacobson MD ([id number]) on 11/25/2020 3:38:28 PM    Referred By: [email or web address]   SELF           Confirmed By:Kimani Jacobson MD                            Imaging Results          X-Ray Chest AP Portable (Final result)  Result time 11/25/20 11:10:29    Final result by Fredy Valencai MD (11/25/20 11:10:29)                 Impression:      New coarse granular reticulonodular opacities in the lungs.  Correlate for pulmonary edema versus pneumonia.    New small bilateral effusions, left greater than right.      Electronically signed by: Fredy Valencia  Date:    11/25/2020  Time:    11:10             Narrative:    EXAMINATION:  XR CHEST AP PORTABLE    CLINICAL HISTORY:  Shortness of breath    TECHNIQUE:  Single frontal view of the chest was performed.    COMPARISON:  Chest x-ray, 10/14/2013    FINDINGS:  Descending thoracic and abdominal aorta stents are again noted.  The heart mediastinal contours appear stable with the previously seen large hiatal hernia not confidently identified.  There is coarse interstitial prominence now present with small to moderate-sized bilateral effusions suggested in the lung bases.                                 Medical Decision Making:   History:   Old Medical Records: I decided to obtain old medical records.  Independently Interpreted Test(s):   I have ordered and independently interpreted X-rays - see prior notes.  I have ordered and independently interpreted EKG Reading(s) - see prior notes  Clinical Tests:   Lab Tests: Ordered and Reviewed  Radiological Study: Ordered and Reviewed  Medical Tests: Ordered and Reviewed  ED  Management:    - COVID 19 positive   - BNP 1100   - Troponin WNL   - CBC w/diff H/H stable; no significant leukocytosis   - CMP notable for Cr of 1.5, but otherwise no significant electrolyte abnormality noted  - Lactic acid WNL   - Lipase WNL   - Urinalysis nitrite positive   - CXR demonstrates new coarse granular reticulonodular opacities in the lungs - correlate for pulmonary edema versus pneumonia; new small bilateral effusions, left greater than right  - will give dose of ceftriaxone and azithromycin to pt  - pt to be admitted to [facility] medicine for further evaluation and management  - pt in agreement with plan for admission at this time                                   Clinical Impression:     ICD-10-CM ICD-9-CM   1. Abdominal pain  R10.9 789.00   2. Shortness of breath  R06.02 786.05   3. CHF (congestive heart failure)  I50.9 428.0   4. COVID-19 virus detected  U07.1 079.89                          ED Disposition Condition    Admit                    I, Silvestre Bell,  personally performed the services described in this documentation. All medical record entries made by the scribe were at my direction and in my presence.  I have reviewed the chart and agree that the record reflects my personal performance and is accurate and complete. Silvestre Bell M.D. 6:03 PM11/25/2020         Silvestre Bell MD  11/25/20 180       Silvestre Bell MD  11/25/20 1813

## 2020-11-25 NOTE — SUBJECTIVE & OBJECTIVE
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    Patient has tolerated topical local anesthetic for cataract procedure without issues    Anesthetics - jordan type- parabens      Patient unsure of med - unable to be awakened.    Patient has tolerated topical local anesthetic for cataract procedure without issues       No current facility-administered medications on file prior to encounter.      Current Outpatient Medications on File Prior to Encounter   Medication Sig    atorvastatin (LIPITOR) 20 MG tablet Take 1 tablet (20 mg total) by mouth once daily.    fluocinonide 0.05% (LIDEX) 0.05 % cream Apply topically 2 (two) times daily as needed.    fluticasone propionate (FLONASE) 50 mcg/actuation nasal spray 2 sprays (100 mcg total) by Each Nostril route once daily.    furosemide (LASIX) 20 MG tablet Take 20 mg by mouth 2 (two) times daily.    gabapentin (NEURONTIN) 100 MG capsule TAKE 1 CAPSULE BY MOUTH THREE TIMES DAILY     lisinopril (PRINIVIL,ZESTRIL) 5 MG tablet Take 1 tablet (5 mg total) by mouth once daily.    lisinopriL (PRINIVIL,ZESTRIL) 5 MG tablet Take 5 mg by mouth once daily.    metoprolol succinate (TOPROL XL) " 25 MG 24 hr tablet Take 25 mg by mouth once daily.    montelukast (SINGULAIR) 10 mg tablet Take 1 tablet (10 mg total) by mouth every evening.    omeprazole (PRILOSEC) 20 MG capsule Take 1 capsule (20 mg total) by mouth once daily.    paroxetine (PAXIL) 10 MG tablet Take 1 tablet (10 mg total) by mouth every morning.    rivaroxaban (XARELTO) 20 mg Tab Take 20 mg by mouth daily with dinner or evening meal.    traZODone (DESYREL) 50 MG tablet Take 50 mg by mouth every evening.     Family History     Problem Relation (Age of Onset)    Cancer Father    Stroke Mother, Sister        Tobacco Use    Smoking status: Former Smoker     Packs/day: 0.50     Years: 14.00     Pack years: 7.00     Quit date: 1999     Years since quittin.6    Smokeless tobacco: Never Used   Substance and Sexual Activity    Alcohol use: No    Drug use: No    Sexual activity: Not Currently     Review of Systems   Constitutional: Positive for activity change, appetite change and fatigue. Negative for chills and fever.   HENT: Negative for sore throat.    Eyes: Positive for visual disturbance.   Respiratory: Positive for shortness of breath. Negative for cough.    Gastrointestinal: Positive for nausea. Negative for abdominal distention, abdominal pain, diarrhea and vomiting.   Endocrine: Negative for polyuria.   Genitourinary: Negative for dysuria.   Musculoskeletal: Positive for arthralgias.   Skin: Negative for rash.   Neurological: Positive for weakness. Negative for dizziness, syncope and light-headedness.   Hematological: Negative for adenopathy.   Psychiatric/Behavioral: Negative for confusion.     Objective:     Vital Signs (Most Recent):  Temp: 97.8 °F (36.6 °C) (20 0908)  Pulse: 72 (20 1400)  Resp: 20 (20 1400)  BP: 138/78 (20 1400)  SpO2: 95 % (20 1400) Vital Signs (24h Range):  Temp:  [97.8 °F (36.6 °C)] 97.8 °F (36.6 °C)  Pulse:  [68-79] 72  Resp:  [17-26] 20  SpO2:  [88 %-96 %] 95 %  BP:  (132-146)/(73-90) 138/78     Weight: 63.5 kg (140 lb)  Body mass index is 24.8 kg/m².    Physical Exam  Constitutional:       General: She is not in acute distress.     Appearance: She is not toxic-appearing or diaphoretic.   HENT:      Head: Normocephalic and atraumatic.      Nose: Nose normal.   Eyes:      Conjunctiva/sclera: Conjunctivae normal.      Pupils: Pupils are equal, round, and reactive to light.   Neck:      Musculoskeletal: Normal range of motion and neck supple.   Cardiovascular:      Rate and Rhythm: Normal rate. Rhythm irregular.   Pulmonary:      Breath sounds: No wheezing.      Comments: Course breath sounds bilaterally but no wheezing or rhonchi, diminished throughout with slight worsening at bases, mild tachypneia  Abdominal:      General: Bowel sounds are normal. There is no distension.      Palpations: Abdomen is soft.      Tenderness: There is no abdominal tenderness. There is no guarding or rebound.   Musculoskeletal: Normal range of motion.      Right lower leg: No edema.      Left lower leg: No edema.   Skin:     Capillary Refill: Capillary refill takes 2 to 3 seconds.   Neurological:      General: No focal deficit present.      Mental Status: She is alert and oriented to person, place, and time.   Psychiatric:         Mood and Affect: Mood normal.         Behavior: Behavior normal.         Thought Content: Thought content normal.         Judgment: Judgment normal.           CRANIAL NERVES     CN III, IV, VI   Pupils are equal, round, and reactive to light.       Significant Labs: All pertinent labs within the past 24 hours have been reviewed.    Significant Imaging: I have reviewed and interpreted all pertinent imaging results/findings within the past 24 hours.

## 2020-11-25 NOTE — ASSESSMENT & PLAN NOTE
Baseline creatinine approximately 1.0  Increased noted on admission  Will hold ACEI and lasix, and treat UTI  Will repeat labs in am and do further workup if no improvement

## 2020-11-25 NOTE — H&P
"[facility] Medicine  History & Physical    Patient Name: Sarah Rice  MRN: [id number]  Admission Date: 11/25/2020  Attending Physician: Dominga Donovan MD  Primary Care Provider: Elsy Jacinto DO         Patient information was obtained from patient, relative(s), past medical records and ER records.     Subjective:     Principal Problem:COVID-19 virus infection    Chief Complaint:   Chief Complaint   Patient presents with    Abdominal Pain     lower abd pain last began last night    Headache     frontal HA x 3 days, denies vision change     Shortness of Breath     that began 2 days ago, pt states she gets weak with walking         HPI: 79 y/o female with CHF, PAF on Xarelto, HTN, anxiety, and h/o CVA and recent diagnosis of COVID + test on 11/3/20 who only had mild symptoms at the time and was self quarantined at home with resolution of symptoms presented with worsening SOB, weakness and malaise with body aches for 2-3 days. She reported nausea, but no vomiting or diarrhea, and had progressive weakness, no specific cough or fever to the best of her knowledge, no chill. Does report having recent family gathering on 11/20/20 and her symptoms started to develop again. Multiple family members recently tested + for COVID. In the ER, patient noted to be hypoxic with sats of 88% on RA and noted mild tachypnea. Workup remarkable for CXR with "new coarse granular reticulonodular opacities in the lungs" and COVID 19 test positive. UA concerning for infection with many bacteria and WBC, positive nitrites. LFTs normal and creatinine at 1.5.    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    Patient has tolerated topical local anesthetic for cataract procedure without issues    Anesthetics - jordan type- parabens      Patient unsure of med - unable to be awakened.    Patient has tolerated topical local anesthetic for cataract procedure without issues       No current facility-administered medications on file prior to encounter.      Current Outpatient Medications on File Prior to Encounter   Medication Sig    atorvastatin (LIPITOR) 20 MG tablet Take 1 tablet (20 mg total) by mouth once daily.    fluocinonide 0.05% (LIDEX) 0.05 % cream Apply topically 2 (two) times daily as needed.    fluticasone propionate (FLONASE) 50 mcg/actuation nasal spray 2 sprays (100 mcg total) by Each Nostril route once daily.    furosemide (LASIX) 20 MG tablet Take 20 mg by mouth 2 (two) times daily.    gabapentin (NEURONTIN) 100 MG capsule TAKE 1 CAPSULE BY MOUTH THREE TIMES DAILY     lisinopril (PRINIVIL,ZESTRIL) 5 MG tablet Take 1 tablet (5 mg total) by mouth once daily.    lisinopriL (PRINIVIL,ZESTRIL) 5 MG tablet Take 5 mg by mouth once daily.    metoprolol succinate (TOPROL XL) 25 MG 24 hr tablet Take 25 mg by mouth once daily.    montelukast (SINGULAIR) 10 mg tablet Take 1 tablet (10 mg total) by mouth every evening.    omeprazole (PRILOSEC) 20 MG capsule Take 1 capsule (20 mg total) by mouth once daily.    paroxetine (PAXIL) 10 MG tablet Take 1 tablet (10 mg total) by mouth every morning.    rivaroxaban (XARELTO) 20 mg  Tab Take 20 mg by mouth daily with dinner or evening meal.    traZODone (DESYREL) 50 MG tablet Take 50 mg by mouth every evening.     Family History     Problem Relation (Age of Onset)    Cancer Father    Stroke Mother, Sister        Tobacco Use    Smoking status: Former Smoker     Packs/day: 0.50     Years: 14.00     Pack years: 7.00     Quit date: 1999     Years since quittin.6    Smokeless tobacco: Never Used   Substance and Sexual Activity    Alcohol use: No    Drug use: No    Sexual activity: Not Currently     Review of Systems   Constitutional: Positive for activity change, appetite change and fatigue. Negative for chills and fever.   HENT: Negative for sore throat.    Eyes: Positive for visual disturbance.   Respiratory: Positive for shortness of breath. Negative for cough.    Gastrointestinal: Positive for nausea. Negative for abdominal distention, abdominal pain, diarrhea and vomiting.   Endocrine: Negative for polyuria.   Genitourinary: Negative for dysuria.   Musculoskeletal: Positive for arthralgias.   Skin: Negative for rash.   Neurological: Positive for weakness. Negative for dizziness, syncope and light-headedness.   Hematological: Negative for adenopathy.   Psychiatric/Behavioral: Negative for confusion.     Objective:     Vital Signs (Most Recent):  Temp: 97.8 °F (36.6 °C) (20 0908)  Pulse: 72 (20 1400)  Resp: 20 (20 1400)  BP: 138/78 (20 1400)  SpO2: 95 % (20 1400) Vital Signs (24h Range):  Temp:  [97.8 °F (36.6 °C)] 97.8 °F (36.6 °C)  Pulse:  [68-79] 72  Resp:  [17-26] 20  SpO2:  [88 %-96 %] 95 %  BP: (132-146)/(73-90) 138/78     Weight: 63.5 kg (140 lb)  Body mass index is 24.8 kg/m².    Physical Exam  Constitutional:       General: She is not in acute distress.     Appearance: She is not toxic-appearing or diaphoretic.   HENT:      Head: Normocephalic and atraumatic.      Nose: Nose normal.   Eyes:      Conjunctiva/sclera: Conjunctivae normal.       Pupils: Pupils are equal, round, and reactive to light.   Neck:      Musculoskeletal: Normal range of motion and neck supple.   Cardiovascular:      Rate and Rhythm: Normal rate. Rhythm irregular.   Pulmonary:      Breath sounds: No wheezing.      Comments: Course breath sounds bilaterally but no wheezing or rhonchi, diminished throughout with slight worsening at bases, mild tachypneia  Abdominal:      General: Bowel sounds are normal. There is no distension.      Palpations: Abdomen is soft.      Tenderness: There is no abdominal tenderness. There is no guarding or rebound.   Musculoskeletal: Normal range of motion.      Right lower leg: No edema.      Left lower leg: No edema.   Skin:     Capillary Refill: Capillary refill takes 2 to 3 seconds.   Neurological:      General: No focal deficit present.      Mental Status: She is alert and oriented to person, place, and time.   Psychiatric:         Mood and Affect: Mood normal.         Behavior: Behavior normal.         Thought Content: Thought content normal.         Judgment: Judgment normal.           CRANIAL NERVES     CN III, IV, VI   Pupils are equal, round, and reactive to light.       Significant Labs: All pertinent labs within the past 24 hours have been reviewed.    Significant Imaging: I have reviewed and interpreted all pertinent imaging results/findings within the past 24 hours.    Assessment/Plan:     * COVID-19 virus infection  Presentation and COVID 19 + positive   CXR with bilateral infiltrates and new hypoxia  No clinical signs of volume overload  High BNP likely due to known CHF   Isolation protocol in place  Will check ECHO but no indication for diuresis  Will check procalcitonin level and continue Rocephin and azithromycin  Will start dexamethasone for 10 day course and consult pharmacy for remdisivir  Will continue to trend inflammatory markers, renal function and LFTs    Urinary tract infection without hematuria  UA consistent with  infection  Treat with empiric Rocephin and will follow up urine cultures    CHRISTIE (acute kidney injury)  Baseline creatinine approximately 1.0  Increased noted on admission  Will hold ACEI and lasix, and treat UTI  Will repeat labs in am and do further workup if no improvement    Chronic diastolic heart failure  Known preserved eEFHF but with diastolic dysfunction  Followed by Dr. Conner  Continue metoprolol, but hold ACEI and lasix for now given CHRISTIE  No signs of decompensation  Will get ECHO but no indication for IV lasix    Paroxysmal atrial fibrillation  Rate controlled on metoprolol and anticoagulated with Xarelto, which will be continued  Stable, followed by Dr. Conner as outpatient    Current use of long term anticoagulation  Continue Xarelto    History of TIA (transient ischemic attack)  Continue ASA and statin    Gastroesophageal reflux disease without esophagitis  Continue PPI    Generalized anxiety disorder  Continue outpatient regimen    Essential hypertension  Continue metoprolol    Hyperlipidemia  Continue atorvastatin      VTE Risk Mitigation (From admission, onward)         Ordered     rivaroxaban tablet 20 mg  With dinner      11/25/20 1452     IP VTE HIGH RISK PATIENT  Once      11/25/20 1452     Place sequential compression device  Until discontinued      11/25/20 1452                   Dominga Donovan MD  Department of Hospital Medicine   [facility]

## 2020-11-25 NOTE — ASSESSMENT & PLAN NOTE
Presentation and COVID 19 + positive   CXR with bilateral infiltrates and new hypoxia  No clinical signs of volume overload  High BNP likely due to known CHF   Isolation protocol in place  Will check ECHO but no indication for diuresis  Will check procalcitonin level and continue Rocephin and azithromycin  Will start dexamethasone for 10 day course and consult pharmacy for remdisivir  Will continue to trend inflammatory markers, renal function and LFTs

## 2020-11-25 NOTE — ASSESSMENT & PLAN NOTE
Rate controlled on metoprolol and anticoagulated with Xarelto, which will be continued  Stable, followed by Dr. Conner as outpatient

## 2020-11-25 NOTE — ASSESSMENT & PLAN NOTE
Known preserved eEFHF but with diastolic dysfunction  Followed by Dr. Conner  Continue metoprolol, but hold ACEI and lasix for now given CHRISTIE  No signs of decompensation  Will get ECHO but no indication for IV lasix

## 2020-11-25 NOTE — ED NOTES
"After medication discuss with pt. Pt took pill and then stated, " I took my blood thinner this morning."  Dr. Bell notified.  "

## 2020-11-25 NOTE — ED NOTES
Present to Ed with multiple complaints. Reports abd pain since last night which has resovled prior to arrival, midsternal chest pain with SOB  last night which has resolved prior to arrival, and posterior throbbing headache x 3 days not relived by Tylenol. Pt able to complete full sentence. Report recent diagnosis of Covid 19 on Nov 3. No acute distress. Pt placed on Airborne and contact precautions.

## 2020-11-25 NOTE — HPI
"79 y/o female with CHF, PAF on Xarelto, HTN, anxiety, and h/o CVA and recent diagnosis of COVID + test on 11/3/20 who only had mild symptoms at the time and was self quarantined at home with resolution of symptoms presented with worsening SOB, weakness and malaise with body aches for 2-3 days. She reported nausea, but no vomiting or diarrhea, and had progressive weakness, no specific cough or fever to the best of her knowledge, no chill. Does report having recent family gathering on 11/20/20 and her symptoms started to develop again. Multiple family members recently tested + for COVID. In the ER, patient noted to be hypoxic with sats of 88% on RA and noted mild tachypnea. Workup remarkable for CXR with "new coarse granular reticulonodular opacities in the lungs" and COVID 19 test positive. UA concerning for infection with many bacteria and WBC, positive nitrites. LFTs normal and creatinine at 1.5.  "

## 2020-11-26 LAB
ALBUMIN SERPL BCP-MCNC: 3.4 G/DL (ref 3.5–5.2)
ALP SERPL-CCNC: 69 U/L (ref 55–135)
ALT SERPL W/O P-5'-P-CCNC: 11 U/L (ref 10–44)
ANION GAP SERPL CALC-SCNC: 11 MMOL/L (ref 8–16)
AST SERPL-CCNC: 18 U/L (ref 10–40)
BASOPHILS # BLD AUTO: 0 K/UL (ref 0–0.2)
BASOPHILS NFR BLD: 0 % (ref 0–1.9)
BILIRUB SERPL-MCNC: 0.6 MG/DL (ref 0.1–1)
BUN SERPL-MCNC: 17 MG/DL (ref 8–23)
CALCIUM SERPL-MCNC: 8.8 MG/DL (ref 8.7–10.5)
CHLORIDE SERPL-SCNC: 107 MMOL/L (ref 95–110)
CO2 SERPL-SCNC: 22 MMOL/L (ref 23–29)
CREAT SERPL-MCNC: 1.3 MG/DL (ref 0.5–1.4)
DIFFERENTIAL METHOD: ABNORMAL
EOSINOPHIL # BLD AUTO: 0 K/UL (ref 0–0.5)
EOSINOPHIL NFR BLD: 0 % (ref 0–8)
ERYTHROCYTE [DISTWIDTH] IN BLOOD BY AUTOMATED COUNT: 13.2 % (ref 11.5–14.5)
EST. GFR  (AFRICAN AMERICAN): 44 ML/MIN/1.73 M^2
EST. GFR  (NON AFRICAN AMERICAN): 38 ML/MIN/1.73 M^2
GLUCOSE SERPL-MCNC: 148 MG/DL (ref 70–110)
HCT VFR BLD AUTO: 34 % (ref 37–48.5)
HGB BLD-MCNC: 10.7 G/DL (ref 12–16)
IMM GRANULOCYTES # BLD AUTO: 0.02 K/UL (ref 0–0.04)
IMM GRANULOCYTES NFR BLD AUTO: 0.5 % (ref 0–0.5)
LYMPHOCYTES # BLD AUTO: 0.8 K/UL (ref 1–4.8)
LYMPHOCYTES NFR BLD: 20.3 % (ref 18–48)
MAGNESIUM SERPL-MCNC: 1.3 MG/DL (ref 1.6–2.6)
MCH RBC QN AUTO: 30.2 PG (ref 27–31)
MCHC RBC AUTO-ENTMCNC: 31.5 G/DL (ref 32–36)
MCV RBC AUTO: 96 FL (ref 82–98)
MONOCYTES # BLD AUTO: 0.1 K/UL (ref 0.3–1)
MONOCYTES NFR BLD: 2.7 % (ref 4–15)
NEUTROPHILS # BLD AUTO: 2.9 K/UL (ref 1.8–7.7)
NEUTROPHILS NFR BLD: 76.5 % (ref 38–73)
NRBC BLD-RTO: 0 /100 WBC
PHOSPHATE SERPL-MCNC: 3.6 MG/DL (ref 2.7–4.5)
PLATELET # BLD AUTO: 158 K/UL (ref 150–350)
PMV BLD AUTO: 10.6 FL (ref 9.2–12.9)
POTASSIUM SERPL-SCNC: 5 MMOL/L (ref 3.5–5.1)
PROT SERPL-MCNC: 6.7 G/DL (ref 6–8.4)
RBC # BLD AUTO: 3.54 M/UL (ref 4–5.4)
SODIUM SERPL-SCNC: 140 MMOL/L (ref 136–145)
WBC # BLD AUTO: 3.75 K/UL (ref 3.9–12.7)

## 2020-11-26 PROCEDURE — 27000221 HC OXYGEN, UP TO 24 HOURS

## 2020-11-26 PROCEDURE — 83735 ASSAY OF MAGNESIUM: CPT

## 2020-11-26 PROCEDURE — 36415 COLL VENOUS BLD VENIPUNCTURE: CPT

## 2020-11-26 PROCEDURE — 99900035 HC TECH TIME PER 15 MIN (STAT)

## 2020-11-26 PROCEDURE — 94761 N-INVAS EAR/PLS OXIMETRY MLT: CPT

## 2020-11-26 PROCEDURE — 84100 ASSAY OF PHOSPHORUS: CPT

## 2020-11-26 PROCEDURE — 63600175 PHARM REV CODE 636 W HCPCS: Performed by: HOSPITALIST

## 2020-11-26 PROCEDURE — 63700000 PHARM REV CODE 250 ALT 637 W/O HCPCS: Performed by: HOSPITALIST

## 2020-11-26 PROCEDURE — 85025 COMPLETE CBC W/AUTO DIFF WBC: CPT

## 2020-11-26 PROCEDURE — 80053 COMPREHEN METABOLIC PANEL: CPT

## 2020-11-26 PROCEDURE — 25000003 PHARM REV CODE 250: Performed by: HOSPITALIST

## 2020-11-26 PROCEDURE — 11000001 HC ACUTE MED/SURG PRIVATE ROOM

## 2020-11-26 RX ORDER — MAGNESIUM SULFATE HEPTAHYDRATE 40 MG/ML
4 INJECTION, SOLUTION INTRAVENOUS ONCE
Status: COMPLETED | OUTPATIENT
Start: 2020-11-26 | End: 2020-11-26

## 2020-11-26 RX ADMIN — GABAPENTIN 100 MG: 100 CAPSULE ORAL at 09:11

## 2020-11-26 RX ADMIN — DEXAMETHASONE 6 MG: 4 TABLET ORAL at 09:11

## 2020-11-26 RX ADMIN — GABAPENTIN 100 MG: 100 CAPSULE ORAL at 03:11

## 2020-11-26 RX ADMIN — REMDESIVIR 100 MG: 100 INJECTION, POWDER, LYOPHILIZED, FOR SOLUTION INTRAVENOUS at 05:11

## 2020-11-26 RX ADMIN — PAROXETINE HYDROCHLORIDE 10 MG: 10 TABLET, FILM COATED ORAL at 06:11

## 2020-11-26 RX ADMIN — TRAZODONE HYDROCHLORIDE 50 MG: 50 TABLET ORAL at 09:11

## 2020-11-26 RX ADMIN — THERA TABS 1 TABLET: TAB at 09:11

## 2020-11-26 RX ADMIN — CEFTRIAXONE SODIUM 1 G: 1 INJECTION, POWDER, FOR SOLUTION INTRAMUSCULAR; INTRAVENOUS at 11:11

## 2020-11-26 RX ADMIN — MONTELUKAST 10 MG: 10 TABLET, FILM COATED ORAL at 09:11

## 2020-11-26 RX ADMIN — PANTOPRAZOLE SODIUM 40 MG: 40 TABLET, DELAYED RELEASE ORAL at 09:11

## 2020-11-26 RX ADMIN — AZITHROMYCIN MONOHYDRATE 250 MG: 250 TABLET ORAL at 09:11

## 2020-11-26 RX ADMIN — METOPROLOL SUCCINATE 25 MG: 25 TABLET, EXTENDED RELEASE ORAL at 09:11

## 2020-11-26 RX ADMIN — CEFTRIAXONE SODIUM 1 G: 1 INJECTION, POWDER, FOR SOLUTION INTRAMUSCULAR; INTRAVENOUS at 12:11

## 2020-11-26 RX ADMIN — CHOLECALCIFEROL TAB 25 MCG (1000 UNIT) 1000 UNITS: 25 TAB at 09:11

## 2020-11-26 RX ADMIN — OXYCODONE HYDROCHLORIDE AND ACETAMINOPHEN 500 MG: 500 TABLET ORAL at 09:11

## 2020-11-26 RX ADMIN — MAGNESIUM SULFATE HEPTAHYDRATE 4 G: 40 INJECTION, SOLUTION INTRAVENOUS at 10:11

## 2020-11-26 RX ADMIN — RIVAROXABAN 20 MG: 20 TABLET, FILM COATED ORAL at 05:11

## 2020-11-26 RX ADMIN — ATORVASTATIN CALCIUM 20 MG: 20 TABLET, FILM COATED ORAL at 09:11

## 2020-11-26 NOTE — PLAN OF CARE
VN cued into room for q2h rounding.  Pt resting comfortably in bed, pt currently sleeping, respirations even and unlabored.  95% on continuous pulse ox.  VN will continue to follow and be available as needed.

## 2020-11-26 NOTE — ASSESSMENT & PLAN NOTE
Known preserved eEFHF but with diastolic dysfunction  Followed by Dr. Conner  Continue metoprolol, but holding ACEI and lasix for now given CHRISTIE  No signs of decompensation  ECHO pending but no indication for IV lasix  Compensated

## 2020-11-26 NOTE — PLAN OF CARE
Care plan reviewed with pt. Cardiac monitoring continues. Safety maintained, bed at lowest position, wheels locked, bed alarm on, call light within reach. Medications administered as ordered. Tolerated well. Continues pulse ox in place, O2 at 2L/nc pulse ox maintained >88%. Denies discomfort.   1755- remdesivir administered, tolerating well.

## 2020-11-26 NOTE — ASSESSMENT & PLAN NOTE
Presentation and COVID 19 + positive   CXR with bilateral infiltrates and new hypoxia  No clinical signs of volume overload  High BNP likely due to known CHF   Isolation protocol in place  ECHO pending but no indication for diuresis  Continue Rocephin and azithromycin  Continue dexamethasone for 10 day course   Remdisivir started yesterday and will be continued  Clinically improving and decreasing O2 needs  Will continue to trend inflammatory markers, renal function and LFTs

## 2020-11-26 NOTE — PROGRESS NOTES
"[facility]  Progress Note    Patient Name: Sarah Rice  MRN: [id number]  Patient Class: IP- Inpatient   Admission Date: 11/25/2020  Length of Stay: 1 days  Attending Physician: Dominga Donovan MD  Primary Care Provider: Elsy Jacinto DO        Subjective:     Principal Problem:COVID-19 virus infection        HPI:  79 y/o female with CHF, PAF on Xarelto, HTN, anxiety, and h/o CVA and recent diagnosis of COVID + test on 11/3/20 who only had mild symptoms at the time and was self quarantined at home with resolution of symptoms presented with worsening SOB, weakness and malaise with body aches for 2-3 days. She reported nausea, but no vomiting or diarrhea, and had progressive weakness, no specific cough or fever to the best of her knowledge, no chill. Does report having recent family gathering on 11/20/20 and her symptoms started to develop again. Multiple family members recently tested + for COVID. In the ER, patient noted to be hypoxic with sats of 88% on RA and noted mild tachypnea. Workup remarkable for CXR with "new coarse granular reticulonodular opacities in the lungs" and COVID 19 test positive. UA concerning for infection with many bacteria and WBC, positive nitrites. LFTs normal and creatinine at 1.5.    Overview/Hospital Course:  Ms. Rice presented with SOB and malaise. Admitted with COVID 19 along with CHRISTIE and UTI. Isolation protocol in place. Treatment initiated with dexamethasone and remdesivir, and on Rocephin and Azithromycin. Lasix and ACEI held for CHRISTIE, and empiric Rocephin already covering for UTI. Supportive care and supplemental O2.    Interval History: Feeling a lot better, no more body aches and breathing much easier. Family at bedside and patient talking non-stop.    Review of Systems   Constitutional: Negative for chills and fever.   Respiratory: Positive for cough. Negative for shortness of breath.    Cardiovascular: Negative for chest pain. "     Objective:     Vital Signs (Most Recent):  Temp: 97.1 °F (36.2 °C) (11/26/20 0913)  Pulse: 74 (11/26/20 0913)  Resp: 18 (11/26/20 0913)  BP: 123/73 (11/26/20 0913)  SpO2: (!) 92 % (11/26/20 0913) Vital Signs (24h Range):  Temp:  [96.2 °F (35.7 °C)-97.5 °F (36.4 °C)] 97.1 °F (36.2 °C)  Pulse:  [70-83] 74  Resp:  [16-31] 18  SpO2:  [86 %-99 %] 92 %  BP: (123-141)/(66-91) 123/73     Weight: 69.3 kg (152 lb 12.5 oz)  Body mass index is 27.06 kg/m².    Intake/Output Summary (Last 24 hours) at 11/26/2020 1331  Last data filed at 11/26/2020 0500  Gross per 24 hour   Intake 300 ml   Output 350 ml   Net -50 ml      Physical Exam  Constitutional:       General: She is not in acute distress.     Appearance: She is not toxic-appearing or diaphoretic.   HENT:      Head: Normocephalic and atraumatic.      Nose: Nose normal.   Eyes:      Conjunctiva/sclera: Conjunctivae normal.      Pupils: Pupils are equal, round, and reactive to light.   Neck:      Musculoskeletal: Normal range of motion and neck supple.   Cardiovascular:      Rate and Rhythm: Normal rate. Rhythm irregular.   Pulmonary:      Effort: Pulmonary effort is normal.      Breath sounds: No wheezing.      Comments: Course breath sounds bilaterally but no wheezing or rhonchi, diminished throughout with slight worsening at bases, improved air flow  Abdominal:      General: Bowel sounds are normal. There is no distension.      Palpations: Abdomen is soft.      Tenderness: There is no abdominal tenderness. There is no guarding or rebound.   Musculoskeletal: Normal range of motion.      Right lower leg: No edema.      Left lower leg: No edema.   Skin:     Capillary Refill: Capillary refill takes 2 to 3 seconds.   Neurological:      General: No focal deficit present.      Mental Status: She is alert and oriented to person, place, and time.   Psychiatric:         Speech: Speech is tangential.      Comments: Awake and alert, oriented, but with need for redirection and with  short term memory impairment         Significant Labs: All pertinent labs within the past 24 hours have been reviewed.    Significant Imaging: I have reviewed and interpreted all pertinent imaging results/findings within the past 24 hours.      Assessment/Plan:      * COVID-19 virus infection  Presentation and COVID 19 + positive   CXR with bilateral infiltrates and new hypoxia  No clinical signs of volume overload  High BNP likely due to known CHF   Isolation protocol in place  ECHO pending but no indication for diuresis  Continue Rocephin and azithromycin  Continue dexamethasone for 10 day course   Remdisivir started yesterday and will be continued  Clinically improving and decreasing O2 needs  Will continue to trend inflammatory markers, renal function and LFTs    Urinary tract infection without hematuria  UA consistent with infection  Continue empiric Rocephin and urine culture pending    CHRISTIE (acute kidney injury)  Baseline creatinine approximately 1.0  Increased noted on admission  Holding ACEI and lasix, and treat UTI  Creatinine is improving  Continue to monitor with repeat am labs    Chronic diastolic heart failure  Known preserved eEFHF but with diastolic dysfunction  Followed by Dr. Conner  Continue metoprolol, but holding ACEI and lasix for now given CHRISTIE  No signs of decompensation  ECHO pending but no indication for IV lasix  Compensated    Paroxysmal atrial fibrillation  Rate controlled on metoprolol and anticoagulated with Xarelto, which will be continued  Stable, followed by Dr. Conner as outpatient    Current use of long term anticoagulation  Continue Xarelto    History of TIA (transient ischemic attack)  Continue ASA and statin    Gastroesophageal reflux disease without esophagitis  Continue PPI    Generalized anxiety disorder  Continue outpatient regimen    Essential hypertension  Continue metoprolol    Hyperlipidemia  Continue atorvastatin    VTE Risk Mitigation (From admission, onward)          Ordered     rivaroxaban tablet 20 mg  With dinner      11/25/20 1452     IP VTE HIGH RISK PATIENT  Once      11/25/20 1452     Place sequential compression device  Until discontinued      11/25/20 1452                  Dominga Donovan MD  Department of Hospital Medicine   [facility]

## 2020-11-26 NOTE — ASSESSMENT & PLAN NOTE
Baseline creatinine approximately 1.0  Increased noted on admission  Holding ACEI and lasix, and treat UTI  Creatinine is improving  Continue to monitor with repeat am labs

## 2020-11-26 NOTE — SUBJECTIVE & OBJECTIVE
Interval History: Feeling a lot better, no more body aches and breathing much easier. Family at bedside and patient talking non-stop.    Review of Systems   Constitutional: Negative for chills and fever.   Respiratory: Positive for cough. Negative for shortness of breath.    Cardiovascular: Negative for chest pain.     Objective:     Vital Signs (Most Recent):  Temp: 97.1 °F (36.2 °C) (11/26/20 0913)  Pulse: 74 (11/26/20 0913)  Resp: 18 (11/26/20 0913)  BP: 123/73 (11/26/20 0913)  SpO2: (!) 92 % (11/26/20 0913) Vital Signs (24h Range):  Temp:  [96.2 °F (35.7 °C)-97.5 °F (36.4 °C)] 97.1 °F (36.2 °C)  Pulse:  [70-83] 74  Resp:  [16-31] 18  SpO2:  [86 %-99 %] 92 %  BP: (123-141)/(66-91) 123/73     Weight: 69.3 kg (152 lb 12.5 oz)  Body mass index is 27.06 kg/m².    Intake/Output Summary (Last 24 hours) at 11/26/2020 1331  Last data filed at 11/26/2020 0500  Gross per 24 hour   Intake 300 ml   Output 350 ml   Net -50 ml      Physical Exam  Constitutional:       General: She is not in acute distress.     Appearance: She is not toxic-appearing or diaphoretic.   HENT:      Head: Normocephalic and atraumatic.      Nose: Nose normal.   Eyes:      Conjunctiva/sclera: Conjunctivae normal.      Pupils: Pupils are equal, round, and reactive to light.   Neck:      Musculoskeletal: Normal range of motion and neck supple.   Cardiovascular:      Rate and Rhythm: Normal rate. Rhythm irregular.   Pulmonary:      Effort: Pulmonary effort is normal.      Breath sounds: No wheezing.      Comments: Course breath sounds bilaterally but no wheezing or rhonchi, diminished throughout with slight worsening at bases, improved air flow  Abdominal:      General: Bowel sounds are normal. There is no distension.      Palpations: Abdomen is soft.      Tenderness: There is no abdominal tenderness. There is no guarding or rebound.   Musculoskeletal: Normal range of motion.      Right lower leg: No edema.      Left lower leg: No edema.   Skin:      Capillary Refill: Capillary refill takes 2 to 3 seconds.   Neurological:      General: No focal deficit present.      Mental Status: She is alert and oriented to person, place, and time.   Psychiatric:         Speech: Speech is tangential.      Comments: Awake and alert, oriented, but with need for redirection and with short term memory impairment         Significant Labs: All pertinent labs within the past 24 hours have been reviewed.    Significant Imaging: I have reviewed and interpreted all pertinent imaging results/findings within the past 24 hours.

## 2020-11-26 NOTE — ED NOTES
Review of patient's allergies indicates:   Allergen Reactions    Anesthetics - amide type      Patient unsure of med - unable to be awakened.    Patient has tolerated topical local anesthetic for cataract procedure without issues    Anesthetics - jordan type- parabens      Patient unsure of med - unable to be awakened.    Patient has tolerated topical local anesthetic for cataract procedure without issues        Patient has verified the spelling of the name and  on armband.   APPEARANCE: Patient is alert, calm, oriented x 4, and does not appear distressed.  SKIN: Skin is normal for race, warm, and dry. Normal skin turgor and mucous membranes moist.  CARDIAC: Normal rate and rhythm, no murmur heard. Pt on cardiac monitoring at this time.    RESPIRATORY:Normal rate and effort. Breath sounds diminished bilaterally throughout chest. Respirations are equal and unlabored.  Pt denies any SOB or cough at this time. Pt is on 2L via nasal canula.  GASTRO: Bowel sounds normal, abdomen is soft, no tenderness, and no abdominal distention.  MUSCLE: Full ROM. No bony tenderness or soft tissue tenderness. No obvious deformity. Pt ambulatory.  PERIPHERAL VASCULAR: peripheral pulses present. Normal cap refill. No edema. Warm to touch.  NEURO: 5/5 strength major flexors/extensors bilaterally. Sensory intact to light touch bilaterally. Luke coma scale: eyes open spontaneously-4, oriented & converses-5, obeys commands-6. No neurological abnormalities.   MENTAL STATUS: awake, alert and aware of environment.  : Voids without complication

## 2020-11-26 NOTE — PLAN OF CARE
Patient is awake and orientedx4. Care plan explained to patient; verbalized understanding. On 2L N/C, O2 saturation at 95-99%. Up to bathroom with assistance. No pain/N/V/D during shift. Due medications given. Encouraged to turn every 2 hours as tolerated. Maintained on fall risk precautions. Bed in lowest position, bed alarm on, call light/personal items within reach and instructed to call for help when needed. Will continue to monitor.

## 2020-11-26 NOTE — HOSPITAL COURSE
Ms. Rice presented with SOB and malaise. Admitted with COVID 19 along with CHRISTIE and UTI. Isolation protocol in place. CXR with reticulonodular opacities bilaterally and she required 2L via NC at presentation. Treatment initiated with dexamethasone and remdesivir, and on Rocephin and Azithromycin. Lasix and ACEI held for CHRISTIE, and empiric Rocephin already ordered covered for UTI as well. Supportive care and supplemental O2 provided. Blood cultures were NGTD and urine culture with pansensitive E.coli. She completed 5 day course with Rocephin which completed her treatment for UTI during hospitalization. Patient clinically improved, and her renal function normalized with creatinine of 1.1 on day of discharge from peak of 1.5. She completed full 5 day course with remdesivir, had 5 days of dexamethasone, and completed 5 days with Rocephin and Azithromycin. She was no longer SOB and was weaned off O2 with sats of 94% on RA on day of discharge. Prescription for 5 more day of dexamethasone written for discharge for patient to complete full 10 day course. Follow up arranged with PCP and patient to self quarantine for 2 weeks.

## 2020-11-27 ENCOUNTER — TELEPHONE (OUTPATIENT)
Dept: FAMILY MEDICINE | Facility: CLINIC | Age: 80
End: 2020-11-27

## 2020-11-27 LAB
ALBUMIN SERPL BCP-MCNC: 3.7 G/DL (ref 3.5–5.2)
ALP SERPL-CCNC: 72 U/L (ref 55–135)
ALT SERPL W/O P-5'-P-CCNC: 13 U/L (ref 10–44)
ANION GAP SERPL CALC-SCNC: 11 MMOL/L (ref 8–16)
AST SERPL-CCNC: 20 U/L (ref 10–40)
BASOPHILS # BLD AUTO: 0.01 K/UL (ref 0–0.2)
BASOPHILS NFR BLD: 0.1 % (ref 0–1.9)
BILIRUB SERPL-MCNC: 0.4 MG/DL (ref 0.1–1)
BUN SERPL-MCNC: 22 MG/DL (ref 8–23)
CALCIUM SERPL-MCNC: 8.8 MG/DL (ref 8.7–10.5)
CHLORIDE SERPL-SCNC: 108 MMOL/L (ref 95–110)
CO2 SERPL-SCNC: 20 MMOL/L (ref 23–29)
CREAT SERPL-MCNC: 1.2 MG/DL (ref 0.5–1.4)
CRP SERPL-MCNC: 1.3 MG/L (ref 0–8.2)
D DIMER PPP IA.FEU-MCNC: 2.08 MG/L FEU
DIFFERENTIAL METHOD: ABNORMAL
EOSINOPHIL # BLD AUTO: 0 K/UL (ref 0–0.5)
EOSINOPHIL NFR BLD: 0 % (ref 0–8)
ERYTHROCYTE [DISTWIDTH] IN BLOOD BY AUTOMATED COUNT: 13.4 % (ref 11.5–14.5)
EST. GFR  (AFRICAN AMERICAN): 49 ML/MIN/1.73 M^2
EST. GFR  (NON AFRICAN AMERICAN): 42 ML/MIN/1.73 M^2
FERRITIN SERPL-MCNC: 82 NG/ML (ref 20–300)
GLUCOSE SERPL-MCNC: 119 MG/DL (ref 70–110)
HCT VFR BLD AUTO: 35.7 % (ref 37–48.5)
HGB BLD-MCNC: 11.2 G/DL (ref 12–16)
IMM GRANULOCYTES # BLD AUTO: 0.03 K/UL (ref 0–0.04)
IMM GRANULOCYTES NFR BLD AUTO: 0.3 % (ref 0–0.5)
LDH SERPL L TO P-CCNC: 224 U/L (ref 110–260)
LYMPHOCYTES # BLD AUTO: 1 K/UL (ref 1–4.8)
LYMPHOCYTES NFR BLD: 10 % (ref 18–48)
MAGNESIUM SERPL-MCNC: 2 MG/DL (ref 1.6–2.6)
MCH RBC QN AUTO: 30.9 PG (ref 27–31)
MCHC RBC AUTO-ENTMCNC: 31.4 G/DL (ref 32–36)
MCV RBC AUTO: 98 FL (ref 82–98)
MONOCYTES # BLD AUTO: 0.7 K/UL (ref 0.3–1)
MONOCYTES NFR BLD: 6.5 % (ref 4–15)
NEUTROPHILS # BLD AUTO: 8.4 K/UL (ref 1.8–7.7)
NEUTROPHILS NFR BLD: 83.1 % (ref 38–73)
NRBC BLD-RTO: 0 /100 WBC
PHOSPHATE SERPL-MCNC: 3.6 MG/DL (ref 2.7–4.5)
PLATELET # BLD AUTO: 175 K/UL (ref 150–350)
PMV BLD AUTO: 10.7 FL (ref 9.2–12.9)
POTASSIUM SERPL-SCNC: 4.5 MMOL/L (ref 3.5–5.1)
PROT SERPL-MCNC: 7.1 G/DL (ref 6–8.4)
RBC # BLD AUTO: 3.63 M/UL (ref 4–5.4)
SODIUM SERPL-SCNC: 139 MMOL/L (ref 136–145)
WBC # BLD AUTO: 10.04 K/UL (ref 3.9–12.7)

## 2020-11-27 PROCEDURE — 84100 ASSAY OF PHOSPHORUS: CPT

## 2020-11-27 PROCEDURE — 63600175 PHARM REV CODE 636 W HCPCS: Performed by: HOSPITALIST

## 2020-11-27 PROCEDURE — 11000001 HC ACUTE MED/SURG PRIVATE ROOM

## 2020-11-27 PROCEDURE — 83615 LACTATE (LD) (LDH) ENZYME: CPT

## 2020-11-27 PROCEDURE — 94761 N-INVAS EAR/PLS OXIMETRY MLT: CPT

## 2020-11-27 PROCEDURE — 25000003 PHARM REV CODE 250: Performed by: HOSPITALIST

## 2020-11-27 PROCEDURE — 27000221 HC OXYGEN, UP TO 24 HOURS

## 2020-11-27 PROCEDURE — 99900035 HC TECH TIME PER 15 MIN (STAT)

## 2020-11-27 PROCEDURE — 85379 FIBRIN DEGRADATION QUANT: CPT

## 2020-11-27 PROCEDURE — 80053 COMPREHEN METABOLIC PANEL: CPT

## 2020-11-27 PROCEDURE — 82728 ASSAY OF FERRITIN: CPT

## 2020-11-27 PROCEDURE — 86140 C-REACTIVE PROTEIN: CPT

## 2020-11-27 PROCEDURE — 83735 ASSAY OF MAGNESIUM: CPT

## 2020-11-27 PROCEDURE — 85025 COMPLETE CBC W/AUTO DIFF WBC: CPT

## 2020-11-27 PROCEDURE — 36415 COLL VENOUS BLD VENIPUNCTURE: CPT

## 2020-11-27 PROCEDURE — 63700000 PHARM REV CODE 250 ALT 637 W/O HCPCS: Performed by: HOSPITALIST

## 2020-11-27 RX ADMIN — TRAZODONE HYDROCHLORIDE 50 MG: 50 TABLET ORAL at 08:11

## 2020-11-27 RX ADMIN — REMDESIVIR 100 MG: 100 INJECTION, POWDER, LYOPHILIZED, FOR SOLUTION INTRAVENOUS at 05:11

## 2020-11-27 RX ADMIN — GUAIFENESIN AND DEXTROMETHORPHAN 10 ML: 100; 10 SYRUP ORAL at 09:11

## 2020-11-27 RX ADMIN — METOPROLOL SUCCINATE 25 MG: 25 TABLET, EXTENDED RELEASE ORAL at 09:11

## 2020-11-27 RX ADMIN — CEFTRIAXONE SODIUM 1 G: 1 INJECTION, POWDER, FOR SOLUTION INTRAMUSCULAR; INTRAVENOUS at 11:11

## 2020-11-27 RX ADMIN — OXYCODONE HYDROCHLORIDE AND ACETAMINOPHEN 500 MG: 500 TABLET ORAL at 08:11

## 2020-11-27 RX ADMIN — PAROXETINE HYDROCHLORIDE 10 MG: 10 TABLET, FILM COATED ORAL at 06:11

## 2020-11-27 RX ADMIN — THERA TABS 1 TABLET: TAB at 09:11

## 2020-11-27 RX ADMIN — OXYCODONE HYDROCHLORIDE AND ACETAMINOPHEN 500 MG: 500 TABLET ORAL at 09:11

## 2020-11-27 RX ADMIN — DEXAMETHASONE 6 MG: 4 TABLET ORAL at 09:11

## 2020-11-27 RX ADMIN — ATORVASTATIN CALCIUM 20 MG: 20 TABLET, FILM COATED ORAL at 09:11

## 2020-11-27 RX ADMIN — AZITHROMYCIN MONOHYDRATE 250 MG: 250 TABLET ORAL at 09:11

## 2020-11-27 RX ADMIN — RIVAROXABAN 20 MG: 20 TABLET, FILM COATED ORAL at 05:11

## 2020-11-27 RX ADMIN — GABAPENTIN 100 MG: 100 CAPSULE ORAL at 09:11

## 2020-11-27 RX ADMIN — CHOLECALCIFEROL TAB 25 MCG (1000 UNIT) 1000 UNITS: 25 TAB at 09:11

## 2020-11-27 RX ADMIN — MONTELUKAST 10 MG: 10 TABLET, FILM COATED ORAL at 08:11

## 2020-11-27 RX ADMIN — GABAPENTIN 100 MG: 100 CAPSULE ORAL at 02:11

## 2020-11-27 RX ADMIN — PANTOPRAZOLE SODIUM 40 MG: 40 TABLET, DELAYED RELEASE ORAL at 09:11

## 2020-11-27 RX ADMIN — GABAPENTIN 100 MG: 100 CAPSULE ORAL at 08:11

## 2020-11-27 NOTE — ASSESSMENT & PLAN NOTE
Presentation and COVID 19 + positive   CXR with bilateral infiltrates and new hypoxia  No clinical signs of volume overload  High BNP likely due to known CHF   Isolation protocol in place  Continue Rocephin and azithromycin  Continue dexamethasone for 10 day course   Continue Remdisivir s  Clinically improving and decreasing O2 needs, will attempt to wean off  Will continue to trend inflammatory markers, renal function and LFTs

## 2020-11-27 NOTE — PLAN OF CARE
VN cued into patients room for q2h rounding - patient resting in bed in no acute distress at this time. Call bell within reach. Will continue to monitor and be available to intervene PRN.

## 2020-11-27 NOTE — ASSESSMENT & PLAN NOTE
Known preserved eEFHF but with diastolic dysfunction  Followed by Dr. Conner  Continue metoprolol, but holding ACEI and lasix for now given CHRISTIE  No signs of decompensation  ECHO deferred at outpatient  Compensated

## 2020-11-27 NOTE — ASSESSMENT & PLAN NOTE
Baseline creatinine approximately 1.0  Increased noted on admission  Holding ACEI and lasix, and treat UTI  Creatinine is improving, now 1.2 and BP increasing  Continue to monitor with repeat am labs

## 2020-11-27 NOTE — TELEPHONE ENCOUNTER
----- Message from Raina Brewer sent at 11/27/2020  4:43 PM CST -----  Regarding: Hosptial Discharge  Patient is discharging from [facility] and needs a Hospital f/u in 2 weeks.  Please schedule appointment and message me back so that Discharge Nurse can relay appointment information to patient prior to discharge.    COVID-19 virus infection        Thanks, Fide Ohara Navigator/Discharge

## 2020-11-27 NOTE — PLAN OF CARE
VN cued into patients room for rounding - patient resting in bed in no acute distress at this time.O2 cont to be admin via nasal cannula. Call bell within reach. Will continue to monitor and be available to intervene PRN.

## 2020-11-27 NOTE — PLAN OF CARE
1900 Reported no pain or respiratory distress.  Pt on continuous monitor. Nurse noted significant heart history, contacted med team for tele monitor order. Order received and monitor placed.      No accu ck.     Tele: Afib,  HR  70,  No alarms.     Bed in lowest position, wheels locked, non skid socks, ID band worn, personal items and call bell with in reach, bed alarm set.

## 2020-11-27 NOTE — SUBJECTIVE & OBJECTIVE
Interval History: Feeling a lot better, no more body aches and breathing much easier. She reports having BM this morning and feels good.    Review of Systems   Constitutional: Negative for chills and fever.   Respiratory: Negative for cough and shortness of breath.    Cardiovascular: Negative for chest pain.     Objective:     Vital Signs (Most Recent):  Temp: 97.3 °F (36.3 °C) (11/27/20 0735)  Pulse: 84 (11/27/20 1144)  Resp: 15 (11/27/20 0735)  BP: (!) 151/92 (11/27/20 0735)  SpO2: (!) 93 % (11/27/20 0738) Vital Signs (24h Range):  Temp:  [96.5 °F (35.8 °C)-97.3 °F (36.3 °C)] 97.3 °F (36.3 °C)  Pulse:  [65-89] 84  Resp:  [15-20] 15  SpO2:  [93 %-99 %] 93 %  BP: (117-151)/(59-92) 151/92     Weight: 69.3 kg (152 lb 12.5 oz)  Body mass index is 27.06 kg/m².    Intake/Output Summary (Last 24 hours) at 11/27/2020 1315  Last data filed at 11/27/2020 0200  Gross per 24 hour   Intake 360 ml   Output 450 ml   Net -90 ml      Physical Exam  Constitutional:       General: She is not in acute distress.     Appearance: She is not toxic-appearing or diaphoretic.   HENT:      Head: Normocephalic and atraumatic.      Nose: Nose normal.   Eyes:      Conjunctiva/sclera: Conjunctivae normal.      Pupils: Pupils are equal, round, and reactive to light.   Neck:      Musculoskeletal: Normal range of motion and neck supple.   Cardiovascular:      Rate and Rhythm: Normal rate. Rhythm irregular.   Pulmonary:      Effort: Pulmonary effort is normal.      Breath sounds: No wheezing.      Comments: Course breath sounds bilaterally but no wheezing or rhonchi, slightly diminished throughout but with improved air flow, getting better daily  Abdominal:      General: Bowel sounds are normal. There is no distension.      Palpations: Abdomen is soft.      Tenderness: There is no abdominal tenderness. There is no guarding or rebound.   Musculoskeletal: Normal range of motion.      Right lower leg: No edema.      Left lower leg: No edema.   Skin:      Capillary Refill: Capillary refill takes 2 to 3 seconds.   Neurological:      General: No focal deficit present.      Mental Status: She is alert and oriented to person, place, and time.   Psychiatric:         Speech: Speech is tangential.      Comments: Awake and alert, oriented, but with need for redirection         Significant Labs: All pertinent labs within the past 24 hours have been reviewed.    Significant Imaging: I have reviewed and interpreted all pertinent imaging results/findings within the past 24 hours.

## 2020-11-27 NOTE — PROGRESS NOTES
"[facility] Hospital Medicine  Progress Note    Patient Name: Sarah Rice  MRN: [id number]  Patient Class: IP- Inpatient   Admission Date: 11/25/2020  Length of Stay: 2 days  Attending Physician: Dominga Donovan MD  Primary Care Provider: Elsy Jacinto DO        Subjective:     Principal Problem:COVID-19 virus infection        HPI:  81 y/o female with CHF, PAF on Xarelto, HTN, anxiety, and h/o CVA and recent diagnosis of COVID + test on 11/3/20 who only had mild symptoms at the time and was self quarantined at home with resolution of symptoms presented with worsening SOB, weakness and malaise with body aches for 2-3 days. She reported nausea, but no vomiting or diarrhea, and had progressive weakness, no specific cough or fever to the best of her knowledge, no chill. Does report having recent family gathering on 11/20/20 and her symptoms started to develop again. Multiple family members recently tested + for COVID. In the ER, patient noted to be hypoxic with sats of 88% on RA and noted mild tachypnea. Workup remarkable for CXR with "new coarse granular reticulonodular opacities in the lungs" and COVID 19 test positive. UA concerning for infection with many bacteria and WBC, positive nitrites. LFTs normal and creatinine at 1.5.    Overview/Hospital Course:  Ms. Rice presented with SOB and malaise. Admitted with COVID 19 along with CHRISTIE and UTI. Isolation protocol in place. Treatment initiated with dexamethasone and remdesivir, and on Rocephin and Azithromycin. Lasix and ACEI held for CHRSITIE, and empiric Rocephin already covering for UTI. Supportive care and supplemental O2. Blood cultures were NGTD and urine culture with GNR.    Interval History: Feeling a lot better, no more body aches and breathing much easier. She reports having BM this morning and feels good.    Review of Systems   Constitutional: Negative for chills and fever.   Respiratory: Negative for cough and shortness of breath.  "   Cardiovascular: Negative for chest pain.     Objective:     Vital Signs (Most Recent):  Temp: 97.3 °F (36.3 °C) (11/27/20 0735)  Pulse: 84 (11/27/20 1144)  Resp: 15 (11/27/20 0735)  BP: (!) 151/92 (11/27/20 0735)  SpO2: (!) 93 % (11/27/20 0738) Vital Signs (24h Range):  Temp:  [96.5 °F (35.8 °C)-97.3 °F (36.3 °C)] 97.3 °F (36.3 °C)  Pulse:  [65-89] 84  Resp:  [15-20] 15  SpO2:  [93 %-99 %] 93 %  BP: (117-151)/(59-92) 151/92     Weight: 69.3 kg (152 lb 12.5 oz)  Body mass index is 27.06 kg/m².    Intake/Output Summary (Last 24 hours) at 11/27/2020 1315  Last data filed at 11/27/2020 0200  Gross per 24 hour   Intake 360 ml   Output 450 ml   Net -90 ml      Physical Exam  Constitutional:       General: She is not in acute distress.     Appearance: She is not toxic-appearing or diaphoretic.   HENT:      Head: Normocephalic and atraumatic.      Nose: Nose normal.   Eyes:      Conjunctiva/sclera: Conjunctivae normal.      Pupils: Pupils are equal, round, and reactive to light.   Neck:      Musculoskeletal: Normal range of motion and neck supple.   Cardiovascular:      Rate and Rhythm: Normal rate. Rhythm irregular.   Pulmonary:      Effort: Pulmonary effort is normal.      Breath sounds: No wheezing.      Comments: Course breath sounds bilaterally but no wheezing or rhonchi, slightly diminished throughout but with improved air flow, getting better daily  Abdominal:      General: Bowel sounds are normal. There is no distension.      Palpations: Abdomen is soft.      Tenderness: There is no abdominal tenderness. There is no guarding or rebound.   Musculoskeletal: Normal range of motion.      Right lower leg: No edema.      Left lower leg: No edema.   Skin:     Capillary Refill: Capillary refill takes 2 to 3 seconds.   Neurological:      General: No focal deficit present.      Mental Status: She is alert and oriented to person, place, and time.   Psychiatric:         Speech: Speech is tangential.      Comments: Awake and  alert, oriented, but with need for redirection         Significant Labs: All pertinent labs within the past 24 hours have been reviewed.    Significant Imaging: I have reviewed and interpreted all pertinent imaging results/findings within the past 24 hours.      Assessment/Plan:      * COVID-19 virus infection  Presentation and COVID 19 + positive   CXR with bilateral infiltrates and new hypoxia  No clinical signs of volume overload  High BNP likely due to known CHF   Isolation protocol in place  Continue Rocephin and azithromycin  Continue dexamethasone for 10 day course   Continue Remdisivir s  Clinically improving and decreasing O2 needs, will attempt to wean off  Will continue to trend inflammatory markers, renal function and LFTs    Urinary tract infection without hematuria  UA consistent with infection  Continue empiric Rocephin   Urine culture with GNR    CHRISTIE (acute kidney injury)  Baseline creatinine approximately 1.0  Increased noted on admission  Holding ACEI and lasix, and treat UTI  Creatinine is improving, now 1.2 and BP increasing  Continue to monitor with repeat am labs    Chronic diastolic heart failure  Known preserved eEFHF but with diastolic dysfunction  Followed by Dr. Conner  Continue metoprolol, but holding ACEI and lasix for now given CHRISTIE  No signs of decompensation  ECHO deferred at outpatient  Compensated    Paroxysmal atrial fibrillation  Rate controlled on metoprolol and anticoagulated with Xarelto, which will be continued  Stable, followed by Dr. Conner as outpatient    Current use of long term anticoagulation  Continue Xarelto    History of TIA (transient ischemic attack)  Continue ASA and statin    Gastroesophageal reflux disease without esophagitis  Continue PPI    Generalized anxiety disorder  Continue outpatient regimen    Essential hypertension  Continue metoprolol    Hyperlipidemia  Continue atorvastatin    VTE Risk Mitigation (From admission, onward)         Ordered     rivaroxaban  tablet 20 mg  With dinner      11/25/20 1452     IP VTE HIGH RISK PATIENT  Once      11/25/20 1452     Place sequential compression device  Until discontinued      11/25/20 1452                Dominga Donovan MD  Department of Hospital Medicine   [facility]

## 2020-11-27 NOTE — PLAN OF CARE
Pt oriented. DCA done via telephone. Covid isolation. Demographics/Ins/NextofKin/PCP verified with patient/family and updated as needed. Denies any DME needs at present from pt/family. Patient/family plans to return home with family. Educated on bedside RX. Patient consents for [place] to discuss discharge plan with next of kin. Preferences appointment times and location obtained. Patient reports they will have transportation home upon discharge.    No future appointments.       11/27/20 5000   Discharge Assessment   Assessment Type Discharge Planning Assessment   Confirmed/corrected address and phone number on facesheet? Yes   Assessment information obtained from? Patient   Communicated expected length of stay with patient/caregiver yes   Prior to hospitilization cognitive status: Alert/Oriented;No Deficits   Prior to hospitalization functional status: Independent   Current cognitive status: Alert/Oriented;No Deficits   Current Functional Status: Independent   Lives With alone   Able to Return to Prior Arrangements yes   Is patient able to care for self after discharge? Yes   Patient's perception of discharge disposition home or selfcare   Readmission Within the Last 30 Days no previous admission in last 30 days   Patient currently being followed by outpatient case management? No   Patient currently receives any other outside agency services? No   Equipment Currently Used at Home cane, straight   Do you have any problems affording any of your prescribed medications? No  ([facility] provides meds)   Is the patient taking medications as prescribed? yes   Does the patient have transportation home? Yes   Transportation Anticipated family or friend will provide   Discharge Plan A Home   DME Needed Upon Discharge  none   Patient/Family in Agreement with Plan yes     Shania Moncada RN  RN Transition Navigator  [phone]

## 2020-11-27 NOTE — PLAN OF CARE
VN cued into pt's room for introduction. VN informed pt that VN would be working along side bedside nurse and PCT throughout shift. Level of present pain assessed. At present no distress noted. O2 cont to be admin via nasal cannula. No SOB to distress noted at this time. Thoroughly discussed with patient the plan of care. Discussed with patient High fall risk protocol and interventions that have been initiated and cont be in place for safety. Patient verbalized clear understanding and cooperation using teach back method. Bed alarm presently activated and in use. Will cont to be available to patient and intervene prn.

## 2020-11-28 LAB
ALBUMIN SERPL BCP-MCNC: 3.4 G/DL (ref 3.5–5.2)
ALP SERPL-CCNC: 73 U/L (ref 55–135)
ALT SERPL W/O P-5'-P-CCNC: 14 U/L (ref 10–44)
ANION GAP SERPL CALC-SCNC: 11 MMOL/L (ref 8–16)
AST SERPL-CCNC: 21 U/L (ref 10–40)
BACTERIA UR CULT: ABNORMAL
BASOPHILS # BLD AUTO: 0 K/UL (ref 0–0.2)
BASOPHILS NFR BLD: 0 % (ref 0–1.9)
BILIRUB SERPL-MCNC: 0.3 MG/DL (ref 0.1–1)
BUN SERPL-MCNC: 24 MG/DL (ref 8–23)
CALCIUM SERPL-MCNC: 8.4 MG/DL (ref 8.7–10.5)
CHLORIDE SERPL-SCNC: 107 MMOL/L (ref 95–110)
CO2 SERPL-SCNC: 20 MMOL/L (ref 23–29)
CREAT SERPL-MCNC: 1.1 MG/DL (ref 0.5–1.4)
DIFFERENTIAL METHOD: ABNORMAL
EOSINOPHIL # BLD AUTO: 0 K/UL (ref 0–0.5)
EOSINOPHIL NFR BLD: 0 % (ref 0–8)
ERYTHROCYTE [DISTWIDTH] IN BLOOD BY AUTOMATED COUNT: 13.6 % (ref 11.5–14.5)
EST. GFR  (AFRICAN AMERICAN): 54 ML/MIN/1.73 M^2
EST. GFR  (NON AFRICAN AMERICAN): 47 ML/MIN/1.73 M^2
GLUCOSE SERPL-MCNC: 105 MG/DL (ref 70–110)
HCT VFR BLD AUTO: 33.5 % (ref 37–48.5)
HGB BLD-MCNC: 10.5 G/DL (ref 12–16)
IMM GRANULOCYTES # BLD AUTO: 0.06 K/UL (ref 0–0.04)
IMM GRANULOCYTES NFR BLD AUTO: 0.6 % (ref 0–0.5)
LYMPHOCYTES # BLD AUTO: 1 K/UL (ref 1–4.8)
LYMPHOCYTES NFR BLD: 9.8 % (ref 18–48)
MAGNESIUM SERPL-MCNC: 1.6 MG/DL (ref 1.6–2.6)
MCH RBC QN AUTO: 31.3 PG (ref 27–31)
MCHC RBC AUTO-ENTMCNC: 31.3 G/DL (ref 32–36)
MCV RBC AUTO: 100 FL (ref 82–98)
MONOCYTES # BLD AUTO: 0.7 K/UL (ref 0.3–1)
MONOCYTES NFR BLD: 6.3 % (ref 4–15)
NEUTROPHILS # BLD AUTO: 8.6 K/UL (ref 1.8–7.7)
NEUTROPHILS NFR BLD: 83.3 % (ref 38–73)
NRBC BLD-RTO: 0 /100 WBC
PHOSPHATE SERPL-MCNC: 3.2 MG/DL (ref 2.7–4.5)
PLATELET # BLD AUTO: 176 K/UL (ref 150–350)
PMV BLD AUTO: 11 FL (ref 9.2–12.9)
POCT GLUCOSE: 105 MG/DL (ref 70–110)
POCT GLUCOSE: 245 MG/DL (ref 70–110)
POTASSIUM SERPL-SCNC: 4.5 MMOL/L (ref 3.5–5.1)
PROT SERPL-MCNC: 6.6 G/DL (ref 6–8.4)
RBC # BLD AUTO: 3.35 M/UL (ref 4–5.4)
SODIUM SERPL-SCNC: 138 MMOL/L (ref 136–145)
WBC # BLD AUTO: 10.3 K/UL (ref 3.9–12.7)

## 2020-11-28 PROCEDURE — 94761 N-INVAS EAR/PLS OXIMETRY MLT: CPT

## 2020-11-28 PROCEDURE — 63700000 PHARM REV CODE 250 ALT 637 W/O HCPCS: Performed by: HOSPITALIST

## 2020-11-28 PROCEDURE — 36415 COLL VENOUS BLD VENIPUNCTURE: CPT

## 2020-11-28 PROCEDURE — 27000221 HC OXYGEN, UP TO 24 HOURS

## 2020-11-28 PROCEDURE — 25000003 PHARM REV CODE 250: Performed by: HOSPITALIST

## 2020-11-28 PROCEDURE — 85025 COMPLETE CBC W/AUTO DIFF WBC: CPT

## 2020-11-28 PROCEDURE — 80053 COMPREHEN METABOLIC PANEL: CPT

## 2020-11-28 PROCEDURE — 83735 ASSAY OF MAGNESIUM: CPT

## 2020-11-28 PROCEDURE — 84100 ASSAY OF PHOSPHORUS: CPT

## 2020-11-28 PROCEDURE — 63600175 PHARM REV CODE 636 W HCPCS: Performed by: HOSPITALIST

## 2020-11-28 PROCEDURE — 11000001 HC ACUTE MED/SURG PRIVATE ROOM

## 2020-11-28 RX ADMIN — DEXAMETHASONE 6 MG: 4 TABLET ORAL at 10:11

## 2020-11-28 RX ADMIN — OXYCODONE HYDROCHLORIDE AND ACETAMINOPHEN 500 MG: 500 TABLET ORAL at 08:11

## 2020-11-28 RX ADMIN — REMDESIVIR 100 MG: 100 INJECTION, POWDER, LYOPHILIZED, FOR SOLUTION INTRAVENOUS at 05:11

## 2020-11-28 RX ADMIN — ATORVASTATIN CALCIUM 20 MG: 20 TABLET, FILM COATED ORAL at 10:11

## 2020-11-28 RX ADMIN — MONTELUKAST 10 MG: 10 TABLET, FILM COATED ORAL at 08:11

## 2020-11-28 RX ADMIN — METOPROLOL SUCCINATE 25 MG: 25 TABLET, EXTENDED RELEASE ORAL at 10:11

## 2020-11-28 RX ADMIN — TRAZODONE HYDROCHLORIDE 50 MG: 50 TABLET ORAL at 08:11

## 2020-11-28 RX ADMIN — GABAPENTIN 100 MG: 100 CAPSULE ORAL at 08:11

## 2020-11-28 RX ADMIN — AZITHROMYCIN MONOHYDRATE 250 MG: 250 TABLET ORAL at 10:11

## 2020-11-28 RX ADMIN — GABAPENTIN 100 MG: 100 CAPSULE ORAL at 03:11

## 2020-11-28 RX ADMIN — GABAPENTIN 100 MG: 100 CAPSULE ORAL at 10:11

## 2020-11-28 RX ADMIN — OXYCODONE HYDROCHLORIDE AND ACETAMINOPHEN 500 MG: 500 TABLET ORAL at 10:11

## 2020-11-28 RX ADMIN — PANTOPRAZOLE SODIUM 40 MG: 40 TABLET, DELAYED RELEASE ORAL at 10:11

## 2020-11-28 RX ADMIN — CHOLECALCIFEROL TAB 25 MCG (1000 UNIT) 1000 UNITS: 25 TAB at 10:11

## 2020-11-28 RX ADMIN — THERA TABS 1 TABLET: TAB at 10:11

## 2020-11-28 RX ADMIN — CEFTRIAXONE SODIUM 1 G: 1 INJECTION, POWDER, FOR SOLUTION INTRAMUSCULAR; INTRAVENOUS at 11:11

## 2020-11-28 RX ADMIN — RIVAROXABAN 20 MG: 20 TABLET, FILM COATED ORAL at 04:11

## 2020-11-28 RX ADMIN — PAROXETINE HYDROCHLORIDE 10 MG: 10 TABLET, FILM COATED ORAL at 06:11

## 2020-11-28 NOTE — ASSESSMENT & PLAN NOTE
Presentation and COVID 19 + positive   CXR with bilateral infiltrates and new hypoxia  No clinical signs of volume overload  High BNP likely due to known CHF   Isolation protocol in place  Continue Rocephin and azithromycin  Continue dexamethasone for 10 day course   Continue Remdisivir   Clinically improving and decreasing O2 needs, will attempt to wean off O2 today  Will continue to trend inflammatory markers, renal function and LFTs

## 2020-11-28 NOTE — PROGRESS NOTES
"[facility] Hospital Medicine  Progress Note    Patient Name: Sarah Rice  MRN: [id number]  Patient Class: IP- Inpatient   Admission Date: 11/25/2020  Length of Stay: 3 days  Attending Physician: Dominga Donovan MD  Primary Care Provider: Elsy Jacinto DO        Subjective:     Principal Problem:COVID-19 virus infection        HPI:  79 y/o female with CHF, PAF on Xarelto, HTN, anxiety, and h/o CVA and recent diagnosis of COVID + test on 11/3/20 who only had mild symptoms at the time and was self quarantined at home with resolution of symptoms presented with worsening SOB, weakness and malaise with body aches for 2-3 days. She reported nausea, but no vomiting or diarrhea, and had progressive weakness, no specific cough or fever to the best of her knowledge, no chill. Does report having recent family gathering on 11/20/20 and her symptoms started to develop again. Multiple family members recently tested + for COVID. In the ER, patient noted to be hypoxic with sats of 88% on RA and noted mild tachypnea. Workup remarkable for CXR with "new coarse granular reticulonodular opacities in the lungs" and COVID 19 test positive. UA concerning for infection with many bacteria and WBC, positive nitrites. LFTs normal and creatinine at 1.5.    Overview/Hospital Course:  Ms. Rice presented with SOB and malaise. Admitted with COVID 19 along with CHRISTIE and UTI. Isolation protocol in place. Treatment initiated with dexamethasone and remdesivir, and on Rocephin and Azithromycin. Lasix and ACEI held for CHRISTIE, and empiric Rocephin already covering for UTI. Supportive care and supplemental O2. Blood cultures were NGTD and urine culture with GNR.    Interval History: Feeling a lot better, no more body aches and breathing much easier.     Review of Systems   Constitutional: Negative for chills and fever.   Respiratory: Negative for cough and shortness of breath.    Cardiovascular: Negative for chest pain. "     Objective:     Vital Signs (Most Recent):  Temp: 97.7 °F (36.5 °C) (11/28/20 0810)  Pulse: 82 (11/28/20 1147)  Resp: 17 (11/28/20 0810)  BP: 110/74 (11/28/20 0810)  SpO2: 97 % (11/28/20 0900) Vital Signs (24h Range):  Temp:  [96.4 °F (35.8 °C)-97.7 °F (36.5 °C)] 97.7 °F (36.5 °C)  Pulse:  [64-97] 82  Resp:  [17-20] 17  SpO2:  [94 %-98 %] 97 %  BP: (110-139)/(74-93) 110/74     Weight: 69.3 kg (152 lb 12.5 oz)  Body mass index is 27.06 kg/m².    Intake/Output Summary (Last 24 hours) at 11/28/2020 1147  Last data filed at 11/27/2020 2308  Gross per 24 hour   Intake 110 ml   Output 600 ml   Net -490 ml      Physical Exam  Constitutional:       General: She is not in acute distress.     Appearance: She is not toxic-appearing or diaphoretic.   HENT:      Head: Normocephalic and atraumatic.      Nose: Nose normal.   Eyes:      Conjunctiva/sclera: Conjunctivae normal.      Pupils: Pupils are equal, round, and reactive to light.   Neck:      Musculoskeletal: Normal range of motion and neck supple.   Cardiovascular:      Rate and Rhythm: Normal rate. Rhythm irregular.   Pulmonary:      Effort: Pulmonary effort is normal.      Breath sounds: No wheezing.      Comments: Course breath sounds bilaterally but no wheezing or rhonchi, slightly diminished throughout but with improved air flow, getting better daily  Abdominal:      General: Bowel sounds are normal. There is no distension.      Palpations: Abdomen is soft.      Tenderness: There is no abdominal tenderness. There is no guarding or rebound.   Musculoskeletal: Normal range of motion.      Right lower leg: No edema.      Left lower leg: No edema.   Skin:     Capillary Refill: Capillary refill takes 2 to 3 seconds.   Neurological:      General: No focal deficit present.      Mental Status: She is alert and oriented to person, place, and time.   Psychiatric:         Speech: Speech is tangential.      Comments: Awake and alert, oriented, but with need for redirection          Significant Labs: All pertinent labs within the past 24 hours have been reviewed.    Significant Imaging: I have reviewed and interpreted all pertinent imaging results/findings within the past 24 hours.      Assessment/Plan:      * COVID-19 virus infection  Presentation and COVID 19 + positive   CXR with bilateral infiltrates and new hypoxia  No clinical signs of volume overload  High BNP likely due to known CHF   Isolation protocol in place  Continue Rocephin and azithromycin  Continue dexamethasone for 10 day course   Continue Remdisivir   Clinically improving and decreasing O2 needs, will attempt to wean off O2 today  Will continue to trend inflammatory markers, renal function and LFTs    Urinary tract infection without hematuria  UA consistent with infection  Continue empiric Rocephin   Urine culture with GNR    CHRISTIE (acute kidney injury)  Baseline creatinine approximately 1.0  Increased noted on admission  Holding ACEI and lasix, and treat UTI  Creatinine is improving, now 1.2 and BP increasing  Continue to monitor with repeat am labs    Chronic diastolic heart failure  Known preserved eEFHF but with diastolic dysfunction  Followed by Dr. Conner  Continue metoprolol, but holding ACEI and lasix for now given CHRISTIE  No signs of decompensation  ECHO deferred at outpatient  Compensated    Paroxysmal atrial fibrillation  Rate controlled on metoprolol and anticoagulated with Xarelto, which will be continued  Stable, followed by Dr. Conner as outpatient    Current use of long term anticoagulation  Continue Xarelto    History of TIA (transient ischemic attack)  Continue ASA and statin    Gastroesophageal reflux disease without esophagitis  Continue PPI    Generalized anxiety disorder  Continue outpatient regimen    Essential hypertension  Continue metoprolol    Hyperlipidemia  Continue atorvastatin      VTE Risk Mitigation (From admission, onward)         Ordered     rivaroxaban tablet 20 mg  With dinner       11/25/20 1452     IP VTE HIGH RISK PATIENT  Once      11/25/20 1452     Place sequential compression device  Until discontinued      11/25/20 1452                  Dominga Donovan MD  Department of Hospital Medicine   [facility]

## 2020-11-28 NOTE — SUBJECTIVE & OBJECTIVE
Interval History: Feeling a lot better, no more body aches and breathing much easier.     Review of Systems   Constitutional: Negative for chills and fever.   Respiratory: Negative for cough and shortness of breath.    Cardiovascular: Negative for chest pain.     Objective:     Vital Signs (Most Recent):  Temp: 97.7 °F (36.5 °C) (11/28/20 0810)  Pulse: 82 (11/28/20 1147)  Resp: 17 (11/28/20 0810)  BP: 110/74 (11/28/20 0810)  SpO2: 97 % (11/28/20 0900) Vital Signs (24h Range):  Temp:  [96.4 °F (35.8 °C)-97.7 °F (36.5 °C)] 97.7 °F (36.5 °C)  Pulse:  [64-97] 82  Resp:  [17-20] 17  SpO2:  [94 %-98 %] 97 %  BP: (110-139)/(74-93) 110/74     Weight: 69.3 kg (152 lb 12.5 oz)  Body mass index is 27.06 kg/m².    Intake/Output Summary (Last 24 hours) at 11/28/2020 1147  Last data filed at 11/27/2020 2308  Gross per 24 hour   Intake 110 ml   Output 600 ml   Net -490 ml      Physical Exam  Constitutional:       General: She is not in acute distress.     Appearance: She is not toxic-appearing or diaphoretic.   HENT:      Head: Normocephalic and atraumatic.      Nose: Nose normal.   Eyes:      Conjunctiva/sclera: Conjunctivae normal.      Pupils: Pupils are equal, round, and reactive to light.   Neck:      Musculoskeletal: Normal range of motion and neck supple.   Cardiovascular:      Rate and Rhythm: Normal rate. Rhythm irregular.   Pulmonary:      Effort: Pulmonary effort is normal.      Breath sounds: No wheezing.      Comments: Course breath sounds bilaterally but no wheezing or rhonchi, slightly diminished throughout but with improved air flow, getting better daily  Abdominal:      General: Bowel sounds are normal. There is no distension.      Palpations: Abdomen is soft.      Tenderness: There is no abdominal tenderness. There is no guarding or rebound.   Musculoskeletal: Normal range of motion.      Right lower leg: No edema.      Left lower leg: No edema.   Skin:     Capillary Refill: Capillary refill takes 2 to 3 seconds.    Neurological:      General: No focal deficit present.      Mental Status: She is alert and oriented to person, place, and time.   Psychiatric:         Speech: Speech is tangential.      Comments: Awake and alert, oriented, but with need for redirection         Significant Labs: All pertinent labs within the past 24 hours have been reviewed.    Significant Imaging: I have reviewed and interpreted all pertinent imaging results/findings within the past 24 hours.

## 2020-11-28 NOTE — PLAN OF CARE
Care plan explained & understood by pt. NSR on Tele. On 02 @ 2L NC, Sats 94%. Meds given as per MAR. Pt walks to the bathroom with minimal assistance. Safety maintained at all times. Call bell within hand. Bed alarm on. Contact, Droplet, Respiratory precautions maintained. Will continue to monitor.

## 2020-11-29 VITALS
SYSTOLIC BLOOD PRESSURE: 133 MMHG | TEMPERATURE: 98 F | HEART RATE: 83 BPM | HEIGHT: 63 IN | RESPIRATION RATE: 16 BRPM | WEIGHT: 152.75 LBS | OXYGEN SATURATION: 94 % | BODY MASS INDEX: 27.07 KG/M2 | DIASTOLIC BLOOD PRESSURE: 91 MMHG

## 2020-11-29 DIAGNOSIS — U07.1 COVID-19 VIRUS DETECTED: ICD-10-CM

## 2020-11-29 LAB
ALBUMIN SERPL BCP-MCNC: 3.2 G/DL (ref 3.5–5.2)
ALP SERPL-CCNC: 64 U/L (ref 55–135)
ALT SERPL W/O P-5'-P-CCNC: 16 U/L (ref 10–44)
ANION GAP SERPL CALC-SCNC: 11 MMOL/L (ref 8–16)
AST SERPL-CCNC: 21 U/L (ref 10–40)
BILIRUB SERPL-MCNC: 0.3 MG/DL (ref 0.1–1)
BUN SERPL-MCNC: 25 MG/DL (ref 8–23)
CALCIUM SERPL-MCNC: 8.4 MG/DL (ref 8.7–10.5)
CHLORIDE SERPL-SCNC: 107 MMOL/L (ref 95–110)
CO2 SERPL-SCNC: 21 MMOL/L (ref 23–29)
CREAT SERPL-MCNC: 1.1 MG/DL (ref 0.5–1.4)
CRP SERPL-MCNC: 0.6 MG/L (ref 0–8.2)
D DIMER PPP IA.FEU-MCNC: 1.75 MG/L FEU
EST. GFR  (AFRICAN AMERICAN): 54 ML/MIN/1.73 M^2
EST. GFR  (NON AFRICAN AMERICAN): 47 ML/MIN/1.73 M^2
FERRITIN SERPL-MCNC: 65 NG/ML (ref 20–300)
GLUCOSE SERPL-MCNC: 116 MG/DL (ref 70–110)
LDH SERPL L TO P-CCNC: 211 U/L (ref 110–260)
MAGNESIUM SERPL-MCNC: 1.5 MG/DL (ref 1.6–2.6)
PHOSPHATE SERPL-MCNC: 2.8 MG/DL (ref 2.7–4.5)
POTASSIUM SERPL-SCNC: 4.8 MMOL/L (ref 3.5–5.1)
PROT SERPL-MCNC: 6.2 G/DL (ref 6–8.4)
SODIUM SERPL-SCNC: 139 MMOL/L (ref 136–145)

## 2020-11-29 PROCEDURE — G0008 ADMIN INFLUENZA VIRUS VAC: HCPCS | Performed by: HOSPITALIST

## 2020-11-29 PROCEDURE — 85379 FIBRIN DEGRADATION QUANT: CPT

## 2020-11-29 PROCEDURE — 63700000 PHARM REV CODE 250 ALT 637 W/O HCPCS: Performed by: HOSPITALIST

## 2020-11-29 PROCEDURE — 36415 COLL VENOUS BLD VENIPUNCTURE: CPT

## 2020-11-29 PROCEDURE — 63600175 PHARM REV CODE 636 W HCPCS: Performed by: HOSPITALIST

## 2020-11-29 PROCEDURE — 84100 ASSAY OF PHOSPHORUS: CPT

## 2020-11-29 PROCEDURE — 90694 VACC AIIV4 NO PRSRV 0.5ML IM: CPT | Performed by: HOSPITALIST

## 2020-11-29 PROCEDURE — 86140 C-REACTIVE PROTEIN: CPT

## 2020-11-29 PROCEDURE — 94761 N-INVAS EAR/PLS OXIMETRY MLT: CPT

## 2020-11-29 PROCEDURE — 25000003 PHARM REV CODE 250: Performed by: HOSPITALIST

## 2020-11-29 PROCEDURE — 80053 COMPREHEN METABOLIC PANEL: CPT

## 2020-11-29 PROCEDURE — 90472 IMMUNIZATION ADMIN EACH ADD: CPT | Performed by: HOSPITALIST

## 2020-11-29 PROCEDURE — 90471 IMMUNIZATION ADMIN: CPT | Performed by: HOSPITALIST

## 2020-11-29 PROCEDURE — 83735 ASSAY OF MAGNESIUM: CPT

## 2020-11-29 PROCEDURE — 82728 ASSAY OF FERRITIN: CPT

## 2020-11-29 PROCEDURE — 83615 LACTATE (LD) (LDH) ENZYME: CPT

## 2020-11-29 RX ORDER — FUROSEMIDE 10 MG/ML
40 INJECTION INTRAMUSCULAR; INTRAVENOUS ONCE
Status: DISCONTINUED | OUTPATIENT
Start: 2020-11-29 | End: 2020-11-29

## 2020-11-29 RX ORDER — DEXAMETHASONE 6 MG/1
6 TABLET ORAL DAILY
Qty: 5 TABLET | Refills: 0 | Status: SHIPPED | OUTPATIENT
Start: 2020-11-29 | End: 2020-12-04

## 2020-11-29 RX ORDER — FUROSEMIDE 20 MG/1
20 TABLET ORAL 2 TIMES DAILY
Status: DISCONTINUED | OUTPATIENT
Start: 2020-11-29 | End: 2020-11-29 | Stop reason: HOSPADM

## 2020-11-29 RX ADMIN — REMDESIVIR 100 MG: 100 INJECTION, POWDER, LYOPHILIZED, FOR SOLUTION INTRAVENOUS at 10:11

## 2020-11-29 RX ADMIN — THERA TABS 1 TABLET: TAB at 09:11

## 2020-11-29 RX ADMIN — AZITHROMYCIN MONOHYDRATE 250 MG: 250 TABLET ORAL at 09:11

## 2020-11-29 RX ADMIN — CEFTRIAXONE SODIUM 1 G: 1 INJECTION, POWDER, FOR SOLUTION INTRAMUSCULAR; INTRAVENOUS at 09:11

## 2020-11-29 RX ADMIN — METOPROLOL SUCCINATE 25 MG: 25 TABLET, EXTENDED RELEASE ORAL at 09:11

## 2020-11-29 RX ADMIN — PANTOPRAZOLE SODIUM 40 MG: 40 TABLET, DELAYED RELEASE ORAL at 09:11

## 2020-11-29 RX ADMIN — FUROSEMIDE 20 MG: 20 TABLET ORAL at 09:11

## 2020-11-29 RX ADMIN — GABAPENTIN 100 MG: 100 CAPSULE ORAL at 09:11

## 2020-11-29 RX ADMIN — PAROXETINE HYDROCHLORIDE 10 MG: 10 TABLET, FILM COATED ORAL at 06:11

## 2020-11-29 RX ADMIN — A/SINGAPORE/GP1908/2015 IVR-180 (AN A/MICHIGAN/45/2015 (H1N1)PDM09-LIKE VIRUS, A/HONG KONG/4801/2014, NYMC X-263B (H3N2) (AN A/HONG KONG/4801/2014-LIKE VIRUS), AND B/BRISBANE/60/2008, WILD TYPE (A B/BRISBANE/60/2008-LIKE VIRUS) 0.5 ML: 15; 15; 15 INJECTION, SUSPENSION INTRAMUSCULAR at 02:11

## 2020-11-29 RX ADMIN — OXYCODONE HYDROCHLORIDE AND ACETAMINOPHEN 500 MG: 500 TABLET ORAL at 09:11

## 2020-11-29 RX ADMIN — DEXAMETHASONE 6 MG: 4 TABLET ORAL at 09:11

## 2020-11-29 RX ADMIN — CHOLECALCIFEROL TAB 25 MCG (1000 UNIT) 1000 UNITS: 25 TAB at 09:11

## 2020-11-29 RX ADMIN — ATORVASTATIN CALCIUM 20 MG: 20 TABLET, FILM COATED ORAL at 09:11

## 2020-11-29 NOTE — PLAN OF CARE
22 gauge PIV to right wrist removed without difficulty, catheter intact- no redness or swelling noted to site at time of removal. Tele monitor removed, cleaned, and returned to telemetry [place]. AVS reviewed with patient's daughter, Emelina, via telephone. Flu shot admin prior to D/C. NADN at time of D/C.

## 2020-11-29 NOTE — DISCHARGE SUMMARY
"[facility] Hospital Medicine  Discharge Summary      Patient Name: Sarah Rice  MRN: [id number]  Admission Date: 11/25/2020  Hospital Length of Stay: 4 days  Discharge Date and Time:  11/29/2020 12:03 PM  Attending Physician: Dominga Donovan MD   Discharging Provider: Dominga Donovan MD  Primary Care Provider: Elsy Jacinto DO      HPI:   81 y/o female with CHF, PAF on Xarelto, HTN, anxiety, and h/o CVA and recent diagnosis of COVID + test on 11/3/20 who only had mild symptoms at the time and was self quarantined at home with resolution of symptoms presented with worsening SOB, weakness and malaise with body aches for 2-3 days. She reported nausea, but no vomiting or diarrhea, and had progressive weakness, no specific cough or fever to the best of her knowledge, no chill. Does report having recent family gathering on 11/20/20 and her symptoms started to develop again. Multiple family members recently tested + for COVID. In the ER, patient noted to be hypoxic with sats of 88% on RA and noted mild tachypnea. Workup remarkable for CXR with "new coarse granular reticulonodular opacities in the lungs" and COVID 19 test positive. UA concerning for infection with many bacteria and WBC, positive nitrites. LFTs normal and creatinine at 1.5.    * No surgery found *      Hospital Course:   Ms. Rice presented with SOB and malaise. Admitted with COVID 19 along with CHRISTIE and UTI. Isolation protocol in place. CXR with reticulonodular opacities bilaterally and she required 2L via NC at presentation. Treatment initiated with dexamethasone and remdesivir, and on Rocephin and Azithromycin. Lasix and ACEI held for CHRISTIE, and empiric Rocephin already ordered covered for UTI as well. Supportive care and supplemental O2 provided. Blood cultures were NGTD and urine culture with pansensitive E.coli. She completed 5 day course with Rocephin which completed her treatment for UTI during hospitalization. Patient " clinically improved, and her renal function normalized with creatinine of 1.1 on day of discharge from peak of 1.5. She completed full 5 day course with remdesivir, had 5 days of dexamethasone, and completed 5 days with Rocephin and Azithromycin. She was no longer SOB and was weaned off O2 with sats of 94% on RA on day of discharge. Prescription for 5 more day of dexamethasone written for discharge for patient to complete full 10 day course. Follow up arranged with PCP and patient to self quarantine for 2 weeks.     Consults:   Consults (From admission, onward)        Status Ordering Provider     Pharmacy Remdesivir Consult  Once     Provider:  (Not yet assigned)    Acknowledged MAKENZIE BLOOM        Service: Hospital Medicine    Final Active Diagnoses:    Diagnosis Date Noted POA    PRINCIPAL PROBLEM:  COVID-19 virus infection [U07.1] 11/25/2020 Yes    Urinary tract infection without hematuria [N39.0] 11/25/2020 Yes    CHRISTIE (acute kidney injury) [N17.9] 11/25/2020 Yes    Paroxysmal atrial fibrillation [I48.0] 06/23/2020 Yes    Chronic diastolic heart failure [I50.32] 06/23/2020 Yes    COVID-19 virus detected [U07.1] 11/25/2020 Yes    History of TIA (transient ischemic attack) [Z86.73] 06/23/2020 Not Applicable    Current use of long term anticoagulation [Z79.01] 06/23/2020 Not Applicable    Gastroesophageal reflux disease without esophagitis [K21.9] 01/05/2016 Yes    Essential hypertension [I10]  Yes    Generalized anxiety disorder [F41.1]  Yes    Hyperlipidemia [E78.5] 08/29/2013 Yes      Problems Resolved During this Admission:       Discharged Condition: good    Disposition: Home or Self Care    Follow Up:  Follow-up Information     Elsy Jacinto DO. Schedule an appointment as soon as possible for a visit in 2 weeks.    Specialties: Family Medicine, Internal Medicine  Why: FOLLOW UP WITH PCP / Offices closed for weekend. Patient to schedule own follow up appointment.  Contact information:  [address]  [facility] [address]  [facility] [address]  [phone]                 Patient Instructions:      Diet Cardiac     Other restrictions (specify):   Order Comments: Self quaratine at home for 2 weeks     Activity as tolerated       Medications:  Reconciled Home Medications:      Medication List      START taking these medications    dexAMETHasone 6 MG tablet  Commonly known as: DECADRON  Take 1 tablet (6 mg total) by mouth once daily. for 5 days        CHANGE how you take these medications    lisinopriL 5 MG tablet  Commonly known as: PRINIVIL,ZESTRIL  Take 1 tablet (5 mg total) by mouth once daily.  What changed: Another medication with the same name was removed. Continue taking this medication, and follow the directions you see here.        CONTINUE taking these medications    atorvastatin 20 MG tablet  Commonly known as: LIPITOR  Take 1 tablet (20 mg total) by mouth once daily.     fluocinonide 0.05% 0.05 % cream  Commonly known as: LIDEX  Apply topically 2 (two) times daily as needed.     fluticasone propionate 50 mcg/actuation nasal spray  Commonly known as: FLONASE  2 sprays (100 mcg total) by Each Nostril route once daily.     furosemide 20 MG tablet  Commonly known as: LASIX  Take 20 mg by mouth 2 (two) times daily.     gabapentin 100 MG capsule  Commonly known as: NEURONTIN  TAKE 1 CAPSULE BY MOUTH THREE TIMES DAILY     montelukast 10 mg tablet  Commonly known as: SINGULAIR  Take 1 tablet (10 mg total) by mouth every evening.     omeprazole 20 MG capsule  Commonly known as: PRILOSEC  Take 1 capsule (20 mg total) by mouth once daily.     paroxetine 10 MG tablet  Commonly known as: PAXIL  Take 1 tablet (10 mg total) by mouth every morning.     rivaroxaban 20 mg Tab  Commonly known as: XARELTO  Take 20 mg by mouth daily with dinner or evening meal.     TOPROL XL 25 MG 24 hr tablet  Generic drug: metoprolol succinate  Take 25 mg by mouth once daily.     traZODone 50 MG tablet  Commonly known as: DESYREL  Take 50 mg  by mouth every evening.            Indwelling Lines/Drains at time of discharge:   Lines/Drains/Airways     None                 Time spent on the discharge of patient: 40 minutes  Patient was seen and examined on the date of discharge and determined to be suitable for discharge.         Dominga Donovan MD  Department of Hospital Medicine  [facility]

## 2020-11-29 NOTE — PLAN OF CARE
Patient SpO2 was maintaining above 90%, so removed nasal cannula.  Patient tolerated room air well.  No reported SOB, so no respiratory interventions needed at this time.  O2 is at bedside in case patient needs, but will continue to monitor.

## 2020-11-29 NOTE — PLAN OF CARE
Discharge orders noted, no HH or HME ordered.    Future Appointments   Date Time Provider Department Center   12/10/2020 12:00 PM Elsy Jacinto,  [facility]       Pt's nurse will go over medications/signs and symptoms prior to discharge       11/29/20 0837   Final Note   Assessment Type Final Discharge Note   Anticipated Discharge Disposition Home   What phone number can be called within the next 1-3 days to see how you are doing after discharge? [phone]   Hospital Follow Up  Appt(s) scheduled? No  (Offices closed for weekend. Patient to schedule own follow up appointment.)   Right Care Referral Info   Post Acute Recommendation No Care     Kenya Kirby, RN Transitional Navigator  [phone]

## 2020-11-29 NOTE — NURSING
Home Oxygen Evaluation    Date Performed: 2020    1) Patient's Home O2 Sat on room air, while at rest: 94%        If O2 sats on room air at rest are 88% or below, patient qualifies. No additional testing needed. Document N/A in steps 2 and 3. If 89% or above, complete steps 2.      2) Patient's O2 Sat on room air while exercisin%        If O2 sats on room air while exercising remain 89% or above patient does not qualify, no further testing needed Document N/A in step 3. If O2 sats on room air while exercising are 88% or below, continue to step 3.      3) N/A       (Must show improvement from #2 for patients to qualify)    If O2 sats improve on oxygen, patient qualifies for portable oxygen. If not, the patient does not qualify.

## 2020-11-29 NOTE — PLAN OF CARE
VN rounds completed.  The patient has no complaints at this time and states that she did have a bowel movement today. The patient is very talkative and states that she has no needs at this time.  Instructed to call for SOB. She verbalized understanding.

## 2020-11-30 ENCOUNTER — PATIENT OUTREACH (OUTPATIENT)
Dept: ADMINISTRATIVE | Facility: CLINIC | Age: 80
End: 2020-11-30

## 2020-11-30 ENCOUNTER — TELEPHONE (OUTPATIENT)
Dept: FAMILY MEDICINE | Facility: CLINIC | Age: 80
End: 2020-11-30

## 2020-11-30 LAB
BACTERIA BLD CULT: NORMAL
BACTERIA BLD CULT: NORMAL

## 2020-11-30 NOTE — PATIENT INSTRUCTIONS
Instructions for Patients with Confirmed or Suspected COVID-19    If you are awaiting your test result, you will either be called or it will be released to the patient portal.  If you have any questions about your test, please visit [email or web address] or call our COVID-19 information line at [phone].      Instructions for non-hospitalized or discharged patients with confirmed or suspected COVID-19:       Stay home except to get medical care.    Separate yourself from other people and animals in your home.    Call ahead before visiting your doctor.    Wear a face mask.    Cover your coughs and sneezes.    Clean your hands often.    Avoid sharing personal household items.    Clean all high-touch surfaces every day.    Monitor your symptoms. Seek prompt medical attention if your illness is worsening (e.g., difficulty breathing). Before seeking care, call your healthcare provider.    If you have a medical emergency and must call 911, notify the dispatcher that you have or are being evaluated for COVID-19. If possible, put on a face mask before emergency medical services arrive.    Use the following symptom-based strategy to return to normal activity following a suspected or confirmed case of COVID-19. Continue isolation until:   o At least 3 days (72 hours) have passed since recovery defined as resolution of fever without the use of fever-reducing medications and improvement in respiratory symptoms (e.g. cough, shortness of breath), and   o At least 10 days have passed since the first positive test.       As one of the next steps, you will receive a call or text from the [place] Department of Health ([facility]) COVID-19 Tracing Team. See the contact information below so you know not to ignore the health departments call. It is important that you contact them back immediately so they can help.     Contact Tracer Number:  [phone]  Caller ID for most carriers: [place] [facility]    What is  contact tracing?   Contact tracing is a process that helps identify everyone who has been in close contact with an infected person. Contact tracers let those people know they may have been exposed and guide them on next steps. Confidentiality is important for everyone; no one will be told who may have exposed them to the virus.   Your involvement is important. The more we know about where and how this virus is spreading, the better chance we have at stopping it from spreading further.  What does exposure mean?   Exposure means you have been within 6 feet for more than 15 minutes with a person who has or had COVID-19.  What kind of questions do the contact tracers ask?   A contact tracer will confirm your basic contact information including name, address, phone number, and next of kin, as well as asking about any symptoms you may have had. Theyll also ask you how you think you may have gotten sick, such as places where you may have been exposed to the virus, and people you were with. Those names will never be shared with anyone outside of that call, and will only be used to help trace and stop the spread of the virus.   I have privacy concerns. How will the state use my information?   Your privacy about your health is important. All calls are completed using call centers that use the appropriate health privacy protection measures (HIPAA compliance), meaning that your patient information is safe. No one will ever ask you any questions related to immigration status. Your health comes first.   Do I have to participate?   You do not have to participate, but we strongly encourage you to. Contact tracing can help us catch and control new outbreaks as theyre developing to keep your friends and family safe.   What if I dont hear from anyone?   If you dont receive a call within 24 hours, you can call the number above right away to inquire about your status. That line is open from 8:00 am - 8:00 p.m., 7 days a  week.  Contact tracing saves lives! Together, we have the power to beat this virus and keep our loved ones and neighbors safe.       Instructions for household members, intimate partners and caregivers in a non-healthcare setting of a patient with confirmed or suspected COVID-19:         Close contacts should monitor their health and call their healthcare provider right away if they develop symptoms suggestive of COVID-19 (e.g., fever, cough, shortness of breath).    Stay home except to get medical care. Separate yourself from other people and animals in the home.   Monitor the patients symptoms. If the patient is getting sicker, call his or her healthcare provider. If the patient has a medical emergency and you need to call 911, notify the dispatch personnel that the patient has or is being evaluated for COVID-19.    Wear a facemask when around other people such as sharing a room or vehicle and before entering a healthcare provider's office.   Cover coughs and sneezes with a tissue. Throw used tissues in a lined trash can immediately and wash hands.   Clean hands often with soap and water for at least 20 seconds or with an alcohol-based hand , rubbing hands together until they feel dry. Avoid touching your eyes, nose, and mouth with unwashed hands.   Clean all high-touch; surfaces every day, including counters, tabletops, doorknobs, bathroom fixtures, toilets, phones, keyboards, tablets, bedside tables, etc. Use a household cleaning spray or wipe according to label instructions.   Avoid sharing personal household items such as dishes, drinking glasses, cups, towels, bedding, etc. After these items are used, they should be washed thoroughly with soap and water.   Continue isolation until:   At least 3 days (72 hours) have passed since recovery defined as resolution of fever without the use of fever-reducing medications and improvement in respiratory symptoms (e.g. cough, shortness of breath),  and    At least 10 days have passed since the patients first positive test.    https://www.cdc.gov/coronavirus/2019-ncov/your-health/index.htm

## 2020-11-30 NOTE — TELEPHONE ENCOUNTER
----- Message from Adarsh Rangel sent at 11/30/2020 12:48 PM CST -----  Regarding: Appointment  Type:  Needs Medical Advice    Who Called:  Emelina Fountain (daughter)  Would the patient rather a call back or a response via [facility]?  Call back  Best Call Back Number:  [phone] (cell)  Additional Information:  please call in regard to her hospital f/u

## 2020-11-30 NOTE — TELEPHONE ENCOUNTER
Please call and schedule patient for hospital follow-up 1-2 weeks from discharge date. Kailee jackson.  Dr. Elsy Jacinto D.O.   Family Medicine

## 2020-12-02 PROBLEM — N39.0 URINARY TRACT INFECTION WITHOUT HEMATURIA: Status: RESOLVED | Noted: 2020-11-25 | Resolved: 2020-12-02

## 2020-12-02 PROBLEM — U07.1 COVID-19 VIRUS DETECTED: Status: RESOLVED | Noted: 2020-11-25 | Resolved: 2020-12-02

## 2020-12-02 NOTE — PROGRESS NOTES
"VIRTUAL VISIT/TELEMEDICINE VISIT  FAMILY MEDICINE  [facility]    The patient location is: [place]  The chief complaint leading to consultation is:  Hospital follow-up  Visit type: Virtual visit with synchronous audio and video with doximity  Total time spent: 30 minute  Each patient to whom he or she provides medical services by telemedicine is:  (1) informed of the relationship between the physician and patient and the respective role of any other health care provider with respect to management of the patient; and (2) notified that he or she may decline to receive medical services by telemedicine and may withdraw from such care at any time.      HOSPITAL FOLLOW-UP/TCM    CC:   Chief Complaint   Patient presents with    Hospital Follow Up       HPI: Sarah Rice   is a 80 y.o. female with     - with hypertension, anxiety, history of AAA repair, insomnia, hyperlipidemia, GERD, pEFHF, history of TIA and pAfib on OAC presents  for hospital follow-up due to COVID-19    Cardiology: Dr. Conner  CT surgery: Dr. Mckeon  [place] [facility]: none    Sarah Rice was admitted to [facility] 11/25/2020 until 11/29/2020 for shortness of breath related to COVID-19.  She initially tested positive on 11/03/2020 and only presented mild symptoms at that time.  She self-quarantine at home and symptoms appear to improved.  However for 2-3 days prior to admission she had noted increased shortness of breath, weakness, and malaise with body aches.  She also reported nausea but denied any vomiting, diarrhea or abdominal pain.  She does report a recent family gathering on 11/20/2020 in symptoms seem to worsen after that event.  Multiple family members have tested positive for COVID-19 recently.  On presentation to the ER, her O2 saturation was noted to be 88% on room air with mild tachycardia.  A chest x-ray done in the ER showed "new coarse granular reticular opacities in the lungs" and COVID testing was still positive. " " Her UA also noted signs of a urinary tract infection.  LFTs were normal and her admission creatinine was 1.5 she was treated with dexamethasone, Remdesivir, and Rocephin as well as azithromycin.  After completing her 5 days of rim does severe and dexamethasone her shortness of breath and continued to improve and she was able to wean off of O2.  Her discharge saturation was 94% on room air.  She was discharged with a 5 days course of dexamethasone.    Today she presents with daughter to assist with visit. She reports that she is doing very well and denies any shortness of breath. She is eating well and energy improved    1. Covid-19 follow-up    Date of first symptoms: 2020  Date of testin/3/2020 and repeat positive testing 2020  Initial Symptoms:  Mild and initially resolved then worsened to severe shortness of breath and SpO2 88%     Date of last fever without anti-pyrectic: no fevers  Current symptoms: resolved    Home O2: none  Pulse ox: none  Covid-19 hospitalization:  2020-2020    Employment: retired  Living environment: lives home alone but family has had her at their house to assist with care  Risk factors: >66 yo, CHF, HTN    Hospital summary:  "79 y/o female with CHF, PAF on Xarelto, HTN, anxiety, and h/o CVA and recent diagnosis of COVID + test on 11/3/20 who only had mild symptoms at the time and was self quarantined at home with resolution of symptoms presented with worsening SOB, weakness and malaise with body aches for 2-3 days. She reported nausea, but no vomiting or diarrhea, and had progressive weakness, no specific cough or fever to the best of her knowledge, no chill. Does report having recent family gathering on 20 and her symptoms started to develop again. Multiple family members recently tested + for COVID. In the ER, patient noted to be hypoxic with sats of 88% on RA and noted mild tachypnea. Workup remarkable for CXR with "new coarse granular reticulonodular " "opacities in the lungs" and COVID 19 test positive. UA concerning for infection with many bacteria and WBC, positive nitrites. LFTs normal and creatinine at 1.5.     Hospital Course:   Ms. Rice presented with SOB and malaise. Admitted with COVID 19 along with CHRISTIE and UTI. Isolation protocol in place. CXR with reticulonodular opacities bilaterally and she required 2L via NC at presentation. Treatment initiated with dexamethasone and remdesivir, and on Rocephin and Azithromycin. Lasix and ACEI held for CHRISTIE, and empiric Rocephin already ordered covered for UTI as well. Supportive care and supplemental O2 provided. Blood cultures were NGTD and urine culture with pansensitive E.coli. She completed 5 day course with Rocephin which completed her treatment for UTI during hospitalization. Patient clinically improved, and her renal function normalized with creatinine of 1.1 on day of discharge from peak of 1.5. She completed full 5 day course with remdesivir, had 5 days of dexamethasone, and completed 5 days with Rocephin and Azithromycin. She was no longer SOB and was weaned off O2 with sats of 94% on RA on day of discharge. Prescription for 5 more day of dexamethasone written for discharge for patient to complete full 10 day course. Follow up arranged with PCP and patient to self quarantine for 2 weeks."         Family and/or Caretaker present at visit?  Yes.  Diagnostic tests reviewed/disposition: I have reviewed all completed as well as pending diagnostic tests at the time of discharge.  Disease/illness education: yes  Home health/community services discussion/referrals: Patient does not have home health established from hospital visit.  They do not need home health.  If needed, we will set up home health for the patient.   Establishment or re-establishment of referral orders for community resources: No other necessary community resources.   Discussion with other health care providers: No discussion with other " "health care providers necessary.   TCC completed: 2020     ROS: Review of Systems   Constitutional: Negative.    HENT: Negative.    Eyes: Negative.    Respiratory: Negative.    Cardiovascular: Negative.    Gastrointestinal: Negative.    Endocrine: Negative.    Genitourinary: Negative.    Musculoskeletal: Negative.    Skin: Negative.    Allergic/Immunologic: Negative.    Neurological: Negative.    Hematological: Negative.    Psychiatric/Behavioral: Negative.        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    GERD (gastroesophageal reflux disease)     hiatel hernia present    Hypertension     Osteoporosis     Outbursts of explosive behavior 10/24/2013    PAF (paroxysmal atrial fibrillation)     Sinus congestion     Stroke        PSHX:   Past Surgical History:   Procedure Laterality Date    CATARACT LEFT EYE Left     EYE SURGERY Left     cataract    HERNIA REPAIR      HYSTERECTOMY      repair of aneuysm      varicose veins         FAMHX:   Family History   Problem Relation Age of Onset    Stroke Mother     Cancer Father     Stroke Sister     Amblyopia Neg Hx     Blindness Neg Hx     Cataracts Neg Hx     Diabetes Neg Hx     Glaucoma Neg Hx     Hypertension Neg Hx     Macular degeneration Neg Hx     Retinal detachment Neg Hx     Strabismus Neg Hx     Thyroid disease Neg Hx        SOCHX:   Social History     Social History Narrative     and lives alone. Good family support with 3 sons and 2 daughters. Does not drive. DME: cane.      Social History     Tobacco Use    Smoking status: Former Smoker     Packs/day: 0.50     Years: 14.00     Pack years: 7.00     Quit date: 1999     Years since quittin.6    Smokeless tobacco: Never Used   Substance " "Use Topics    Alcohol use: No    Drug use: No         ALLERGIES:   Review of patient's allergies indicates:   Allergen Reactions    Anesthetics - amide type      Patient unsure of med - unable to be awakened.    Patient has tolerated topical local anesthetic for cataract procedure without issues    Anesthetics - jordan type- parabens      Patient unsure of med - unable to be awakened.    Patient has tolerated topical local anesthetic for cataract procedure without issues       MEDS:   Current Outpatient Medications:     atorvastatin (LIPITOR) 20 MG tablet, Take 1 tablet (20 mg total) by mouth once daily., Disp: 90 tablet, Rfl: 3    fluocinonide 0.05% (LIDEX) 0.05 % cream, Apply topically 2 (two) times daily as needed., Disp: 180 g, Rfl: 0    fluticasone propionate (FLONASE) 50 mcg/actuation nasal spray, 2 sprays (100 mcg total) by Each Nostril route once daily., Disp: 6 Bottle, Rfl: 1    furosemide (LASIX) 20 MG tablet, Take 20 mg by mouth 2 (two) times daily., Disp: , Rfl:     gabapentin (NEURONTIN) 100 MG capsule, TAKE 1 CAPSULE BY MOUTH THREE TIMES DAILY , Disp: 90 capsule, Rfl: 0    meloxicam (MOBIC) 15 MG tablet, Take 15 mg by mouth once daily., Disp: , Rfl:     metoprolol succinate (TOPROL XL) 25 MG 24 hr tablet, Take 25 mg by mouth once daily., Disp: , Rfl:     montelukast (SINGULAIR) 10 mg tablet, Take 1 tablet (10 mg total) by mouth every evening., Disp: 90 tablet, Rfl: 3    omeprazole (PRILOSEC) 20 MG capsule, Take 1 capsule (20 mg total) by mouth once daily., Disp: 90 capsule, Rfl: 3    paroxetine (PAXIL) 10 MG tablet, Take 1 tablet (10 mg total) by mouth every morning., Disp: 90 tablet, Rfl: 3    rivaroxaban (XARELTO) 20 mg Tab, Take 20 mg by mouth daily with dinner or evening meal., Disp: , Rfl:     traZODone (DESYREL) 50 MG tablet, Take 50 mg by mouth every evening., Disp: , Rfl:     OBJECTIVE:   Vitals:    12/03/20 1031 12/03/20 1203   BP:  122/70   Height: 5' 3" (1.6 m)      Body mass " index is 27.06 kg/m².    Physical Exam  Constitutional:       General: She is not in acute distress.     Appearance: She is not ill-appearing or toxic-appearing.   Pulmonary:      Effort: Pulmonary effort is normal. No respiratory distress.   Musculoskeletal:      Right lower leg: No edema.      Left lower leg: No edema.   Neurological:      Mental Status: She is alert.   Psychiatric:         Speech: Speech normal.           PERTINANT RESULTS:   BMP  Lab Results   Component Value Date     11/29/2020    K 4.8 11/29/2020     11/29/2020    CO2 21 (L) 11/29/2020    BUN 25 (H) 11/29/2020    CREATININE 1.1 11/29/2020    CALCIUM 8.4 (L) 11/29/2020    ANIONGAP 11 11/29/2020    ESTGFRAFRICA 54 (A) 11/29/2020    EGFRNONAA 47 (A) 11/29/2020       Lab Results   Component Value Date    ALT 16 11/29/2020    AST 21 11/29/2020    ALKPHOS 64 11/29/2020    BILITOT 0.3 11/29/2020       Lab Results   Component Value Date    WBC 10.30 11/28/2020    HGB 10.5 (L) 11/28/2020    HCT 33.5 (L) 11/28/2020     (H) 11/28/2020     11/28/2020           ASSESSMENT/PLAN:  Problem List Items Addressed This Visit        Psychiatric    Generalized anxiety disorder    Overview     - well controlled  - continue current medication         Relevant Medications    paroxetine (PAXIL) 10 MG tablet       ENT    Chronic rhinitis    Overview     - well controlled  - continue current medication         Relevant Medications    montelukast (SINGULAIR) 10 mg tablet       Cardiac/Vascular    Chronic diastolic heart failure    Overview     - eEFHF  - followed by Dr. Conner  - lisinopril 5 mg discontinued last visit with Dr. Conner  -  BBB and lasix  - no signs of decompensation         Essential hypertension    Overview     - well controlled  - continue current medication         Paroxysmal atrial fibrillation    Overview     - followed by Dr. Conner  - rate controlled  - OAC Xarelto            Renal/    CHRISTIE (acute kidney injury)    Overview      - baseline creatinine 1  Lab Results   Component Value Date    CREATININE 1.1 11/29/2020     - secondary to COVID-19 infection  - resolved with IV fluid            Other    COVID-19 virus infection - Primary    Overview     - initial test positive 11/03/2020  - symptoms worsened and repeat positive 11/25/2020  - admitted 11/25/20 - 11/29/2020 for COVID pneumonia  - denies any shortness of breath does not have a pulse ox at home  - appears to be improved and family agrees  - supportive care and also discussed continue to monitor closely as prolonged COVID infections can wax and wane               ORDERS:   Orders Placed This Encounter    montelukast (SINGULAIR) 10 mg tablet    paroxetine (PAXIL) 10 MG tablet       Vaccines recommended:  Optional shingles and Tdap    Follow-up 4 months or sooner if any concerns.    Dr. Elsy Jacinto D.O.   Family Medicine

## 2020-12-03 ENCOUNTER — TELEPHONE (OUTPATIENT)
Dept: FAMILY MEDICINE | Facility: CLINIC | Age: 80
End: 2020-12-03

## 2020-12-03 ENCOUNTER — OFFICE VISIT (OUTPATIENT)
Dept: FAMILY MEDICINE | Facility: CLINIC | Age: 80
End: 2020-12-03
Payer: MEDICARE

## 2020-12-03 VITALS — HEIGHT: 63 IN | DIASTOLIC BLOOD PRESSURE: 70 MMHG | BODY MASS INDEX: 27.06 KG/M2 | SYSTOLIC BLOOD PRESSURE: 122 MMHG

## 2020-12-03 DIAGNOSIS — I10 ESSENTIAL HYPERTENSION: ICD-10-CM

## 2020-12-03 DIAGNOSIS — F41.1 GENERALIZED ANXIETY DISORDER: ICD-10-CM

## 2020-12-03 DIAGNOSIS — N17.9 AKI (ACUTE KIDNEY INJURY): ICD-10-CM

## 2020-12-03 DIAGNOSIS — I50.32 CHRONIC DIASTOLIC HEART FAILURE: ICD-10-CM

## 2020-12-03 DIAGNOSIS — U07.1 COVID-19 VIRUS INFECTION: Primary | ICD-10-CM

## 2020-12-03 DIAGNOSIS — I48.0 PAROXYSMAL ATRIAL FIBRILLATION: ICD-10-CM

## 2020-12-03 DIAGNOSIS — J31.0 CHRONIC RHINITIS: ICD-10-CM

## 2020-12-03 PROBLEM — R06.02 SHORTNESS OF BREATH: Status: RESOLVED | Noted: 2020-11-25 | Resolved: 2020-12-03

## 2020-12-03 PROCEDURE — 99496 TRANSJ CARE MGMT HIGH F2F 7D: CPT | Mod: 95,,, | Performed by: FAMILY MEDICINE

## 2020-12-03 PROCEDURE — 99496 TRANSITIONAL CARE MANAGE SERVICE 7 DAY DISCHARGE: ICD-10-PCS | Mod: 95,,, | Performed by: FAMILY MEDICINE

## 2020-12-03 RX ORDER — PAROXETINE 10 MG/1
10 TABLET, FILM COATED ORAL EVERY MORNING
Qty: 90 TABLET | Refills: 3 | Status: SHIPPED | OUTPATIENT
Start: 2020-12-03 | End: 2021-01-01

## 2020-12-03 RX ORDER — MELOXICAM 15 MG/1
7.5 TABLET ORAL DAILY
COMMUNITY
End: 2022-01-01

## 2020-12-03 RX ORDER — MONTELUKAST SODIUM 10 MG/1
10 TABLET ORAL NIGHTLY
Qty: 90 TABLET | Refills: 3 | Status: SHIPPED | OUTPATIENT
Start: 2020-12-03 | End: 2021-01-01

## 2020-12-03 NOTE — PHYSICIAN QUERY
PT Name: Sarah Rice  MR #: [id number]     RESPIRATORY CONDITION CLARIFICATION     CDS/: Abi Melendrez               Contact information:[email or web address]  This form is a permanent document in the medical record.    Query Date: December 3, 2020    By submitting this query, we are merely seeking further clarification of documentation.  Please utilize your independent clinical judgment when addressing the question(s) below.  The Medical Record contains the following:   Indicators   Supporting Clinical Findings Location in Medical Record    Pneumonia documented     x Chest X-Ray/CT Scan New coarse granular reticulonodular opacities in the lungs.Correlate for pulmonary edema versus pneumonia.    New small bilateral effusions, left greater than right.   CXR 11/25   x PaO2    PaCO2     O2 sat SpO2: [88 %-96 %]   H&P 11/25   x WBC   WBC 7.27 3.75 (L) 10.04 10.30        Labs 11/25 - 11/28   x Vital Signs Temp: [97.8 °F (36.6 °C)]  Pulse: [68-79]   Resp: [17-26]   SpO2: [88 %-96 %]   BP: (132-146)/(73-90)    H&P 11/25   x Cultures  No growth after 5 days.   Blood cxs 11/25   x Treatment  Will check procalcitonin level and continue Rocephin and azithromycin   Will start dexamethasone for 10 day course and consult pharmacy for remdisivir    H&P 11/25   x Supplemental O2 On 2L N/C   POC note 11/26 0157    Dysphagia/Swallow study     x Other CXR with bilateral infiltrates and new hypoxia    H&P 11/25     Provider, please provide the diagnosis related to the above clinical indicators:    [  x ] COVID-19 Pneumonia   [   ] Unspecified Pneumonia   [   ] Other type of pneumonia (please specify): ________   [  ] Clinically undetermined     Please document in your progress notes daily for the duration of treatment, until resolved, and include in your discharge summary.     Form No. [id number]

## 2020-12-03 NOTE — TELEPHONE ENCOUNTER
----- Message from Elsy Jacinto DO sent at 12/3/2020 10:34 AM CST -----  Please call patient and schedule follow-up for 4 months

## 2020-12-11 ENCOUNTER — PATIENT MESSAGE (OUTPATIENT)
Dept: OTHER | Facility: OTHER | Age: 80
End: 2020-12-11

## 2020-12-22 ENCOUNTER — PATIENT OUTREACH (OUTPATIENT)
Dept: ADMINISTRATIVE | Facility: OTHER | Age: 80
End: 2020-12-22

## 2020-12-22 NOTE — PROGRESS NOTES
LINKS immunization registry not responding  Care Everywhere updated  Health Maintenance updated  Chart reviewed for overdue Proactive [facility] ([facility]) health maintenance testing (CRS, Breast Ca, Diabetic Eye Exam)   Orders entered:N/A

## 2021-01-01 ENCOUNTER — PATIENT MESSAGE (OUTPATIENT)
Dept: FAMILY MEDICINE | Facility: CLINIC | Age: 81
End: 2021-01-01
Payer: MEDICARE

## 2021-01-01 ENCOUNTER — PATIENT MESSAGE (OUTPATIENT)
Dept: FAMILY MEDICINE | Facility: CLINIC | Age: 81
End: 2021-01-01

## 2021-01-01 ENCOUNTER — PATIENT OUTREACH (OUTPATIENT)
Dept: ADMINISTRATIVE | Facility: HOSPITAL | Age: 81
End: 2021-01-01
Payer: MEDICARE

## 2021-01-01 ENCOUNTER — EXTERNAL HOME HEALTH (OUTPATIENT)
Dept: HOME HEALTH SERVICES | Facility: HOSPITAL | Age: 81
End: 2021-01-01
Payer: MEDICARE

## 2021-01-01 ENCOUNTER — HOSPITAL ENCOUNTER (EMERGENCY)
Facility: HOSPITAL | Age: 81
Discharge: HOME | End: 2021-07-15
Attending: EMERGENCY MEDICINE
Payer: MEDICARE

## 2021-01-01 ENCOUNTER — OFFICE VISIT (OUTPATIENT)
Dept: FAMILY MEDICINE | Facility: CLINIC | Age: 81
End: 2021-01-01
Payer: MEDICARE

## 2021-01-01 ENCOUNTER — DOCUMENT SCAN (OUTPATIENT)
Dept: HOME HEALTH SERVICES | Facility: HOSPITAL | Age: 81
End: 2021-01-01
Payer: MEDICARE

## 2021-01-01 ENCOUNTER — HOSPITAL ENCOUNTER (EMERGENCY)
Facility: HOSPITAL | Age: 81
Discharge: HOME | End: 2021-11-08
Attending: EMERGENCY MEDICINE
Payer: MEDICARE

## 2021-01-01 ENCOUNTER — PATIENT OUTREACH (OUTPATIENT)
Dept: ADMINISTRATIVE | Facility: HOSPITAL | Age: 81
End: 2021-01-01

## 2021-01-01 ENCOUNTER — TELEPHONE (OUTPATIENT)
Dept: FAMILY MEDICINE | Facility: CLINIC | Age: 81
End: 2021-01-01

## 2021-01-01 VITALS
OXYGEN SATURATION: 96 % | WEIGHT: 129.19 LBS | DIASTOLIC BLOOD PRESSURE: 62 MMHG | SYSTOLIC BLOOD PRESSURE: 110 MMHG | HEART RATE: 63 BPM | HEIGHT: 63 IN | BODY MASS INDEX: 22.89 KG/M2 | RESPIRATION RATE: 18 BRPM

## 2021-01-01 VITALS
DIASTOLIC BLOOD PRESSURE: 74 MMHG | WEIGHT: 135 LBS | TEMPERATURE: 98 F | BODY MASS INDEX: 23.91 KG/M2 | OXYGEN SATURATION: 95 % | SYSTOLIC BLOOD PRESSURE: 132 MMHG | RESPIRATION RATE: 18 BRPM | HEART RATE: 87 BPM

## 2021-01-01 VITALS
BODY MASS INDEX: 23.04 KG/M2 | HEART RATE: 62 BPM | DIASTOLIC BLOOD PRESSURE: 76 MMHG | SYSTOLIC BLOOD PRESSURE: 104 MMHG | OXYGEN SATURATION: 98 % | HEIGHT: 63 IN | WEIGHT: 130 LBS

## 2021-01-01 VITALS
BODY MASS INDEX: 23.92 KG/M2 | OXYGEN SATURATION: 95 % | TEMPERATURE: 99 F | HEIGHT: 63 IN | SYSTOLIC BLOOD PRESSURE: 132 MMHG | DIASTOLIC BLOOD PRESSURE: 92 MMHG | RESPIRATION RATE: 18 BRPM | HEART RATE: 89 BPM | WEIGHT: 135 LBS

## 2021-01-01 DIAGNOSIS — Z79.01 CURRENT USE OF LONG TERM ANTICOAGULATION: ICD-10-CM

## 2021-01-01 DIAGNOSIS — I70.0 AORTIC ATHEROSCLEROSIS: ICD-10-CM

## 2021-01-01 DIAGNOSIS — I50.32 CHRONIC DIASTOLIC HEART FAILURE: ICD-10-CM
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
## 2021-01-01 DIAGNOSIS — Z86.79 S/P AAA (ABDOMINAL AORTIC ANEURYSM) REPAIR: ICD-10-CM

## 2021-01-01 DIAGNOSIS — I12.9 BENIGN HYPERTENSION WITH CHRONIC KIDNEY DISEASE, STAGE III: ICD-10-CM

## 2021-01-01 DIAGNOSIS — Z86.73 HISTORY OF TIA (TRANSIENT ISCHEMIC ATTACK): ICD-10-CM

## 2021-01-01 DIAGNOSIS — F02.80 LATE ONSET ALZHEIMER'S DEMENTIA WITHOUT BEHAVIORAL DISTURBANCE: ICD-10-CM

## 2021-01-01 DIAGNOSIS — Z98.890 S/P AAA (ABDOMINAL AORTIC ANEURYSM) REPAIR: ICD-10-CM

## 2021-01-01 DIAGNOSIS — I10 ESSENTIAL HYPERTENSION: Primary | ICD-10-CM

## 2021-01-01 DIAGNOSIS — F33.1 MODERATE EPISODE OF RECURRENT MAJOR DEPRESSIVE DISORDER: ICD-10-CM

## 2021-01-01 DIAGNOSIS — I10 ESSENTIAL HYPERTENSION: ICD-10-CM

## 2021-01-01 DIAGNOSIS — R55 SYNCOPE, UNSPECIFIED SYNCOPE TYPE: Primary | ICD-10-CM

## 2021-01-01 DIAGNOSIS — S20.211S RIB CONTUSION, RIGHT, SEQUELA: ICD-10-CM

## 2021-01-01 DIAGNOSIS — R10.84 GENERALIZED ABDOMINAL PAIN: Primary | ICD-10-CM

## 2021-01-01 DIAGNOSIS — N18.31 STAGE 3A CHRONIC KIDNEY DISEASE: ICD-10-CM

## 2021-01-01 DIAGNOSIS — E78.2 MIXED HYPERLIPIDEMIA: ICD-10-CM

## 2021-01-01 DIAGNOSIS — I48.0 PAROXYSMAL ATRIAL FIBRILLATION: ICD-10-CM

## 2021-01-01 DIAGNOSIS — T82.330A: ICD-10-CM

## 2021-01-01 DIAGNOSIS — N18.30 BENIGN HYPERTENSION WITH CHRONIC KIDNEY DISEASE, STAGE III: ICD-10-CM

## 2021-01-01 DIAGNOSIS — F41.1 GENERALIZED ANXIETY DISORDER: ICD-10-CM

## 2021-01-01 DIAGNOSIS — K59.04 CHRONIC IDIOPATHIC CONSTIPATION: ICD-10-CM

## 2021-01-01 DIAGNOSIS — N30.00 ACUTE CYSTITIS WITHOUT HEMATURIA: ICD-10-CM

## 2021-01-01 DIAGNOSIS — E86.0 DEHYDRATION: ICD-10-CM

## 2021-01-01 DIAGNOSIS — R26.81 UNSTABLE GAIT: ICD-10-CM

## 2021-01-01 DIAGNOSIS — G30.1 LATE ONSET ALZHEIMER'S DEMENTIA WITHOUT BEHAVIORAL DISTURBANCE: ICD-10-CM

## 2021-01-01 DIAGNOSIS — M25.562 LEFT KNEE PAIN: ICD-10-CM

## 2021-01-01 DIAGNOSIS — R41.82 ALTERED MENTAL STATUS: ICD-10-CM

## 2021-01-01 DIAGNOSIS — N18.9 CHRONIC KIDNEY DISEASE, UNSPECIFIED CKD STAGE: ICD-10-CM

## 2021-01-01 DIAGNOSIS — M17.12 PRIMARY OSTEOARTHRITIS OF LEFT KNEE: Primary | ICD-10-CM

## 2021-01-01 DIAGNOSIS — I34.0 NONRHEUMATIC MITRAL VALVE REGURGITATION: ICD-10-CM

## 2021-01-01 LAB
ALBUMIN SERPL BCP-MCNC: 3.1 G/DL (ref 3.5–5.2)
ALP SERPL-CCNC: 88 U/L (ref 55–135)
ALT SERPL W/O P-5'-P-CCNC: 18 U/L (ref 10–44)
AMPHET+METHAMPHET UR QL: ABNORMAL
ANION GAP SERPL CALC-SCNC: 11 MMOL/L (ref 8–16)
AST SERPL-CCNC: 28 U/L (ref 10–40)
BACTERIA #/AREA URNS HPF: ABNORMAL /HPF
BARBITURATES UR QL SCN>200 NG/ML: NEGATIVE
BENZODIAZ UR QL SCN>200 NG/ML: NEGATIVE
BILIRUB SERPL-MCNC: 0.5 MG/DL (ref 0.1–1)
BILIRUB UR QL STRIP: NEGATIVE
BNP SERPL-MCNC: 239 PG/ML (ref 0–99)
BUN SERPL-MCNC: 18 MG/DL (ref 8–23)
BZE UR QL SCN: NEGATIVE
CALCIUM SERPL-MCNC: 8.8 MG/DL (ref 8.7–10.5)
CANNABINOIDS UR QL SCN: NEGATIVE
CHLORIDE SERPL-SCNC: 105 MMOL/L (ref 95–110)
CHOLEST SERPL-MSCNC: 161 MG/DL (ref 0–200)
CLARITY UR: CLEAR
CO2 SERPL-SCNC: 19 MMOL/L (ref 23–29)
COLOR UR: YELLOW
CREAT SERPL-MCNC: 1.6 MG/DL (ref 0.5–1.4)
CREAT UR-MCNC: 217.9 MG/DL (ref 15–325)
ERYTHROCYTE [DISTWIDTH] IN BLOOD BY AUTOMATED COUNT: 14.2 % (ref 11.5–14.5)
EST. GFR  (AFRICAN AMERICAN): 34 ML/MIN/1.73 M^2
EST. GFR  (NON AFRICAN AMERICAN): 30 ML/MIN/1.73 M^2
GLUCOSE SERPL-MCNC: 122 MG/DL (ref 70–110)
GLUCOSE UR QL STRIP: NEGATIVE
HCT VFR BLD AUTO: 34.2 % (ref 37–48.5)
HDL/CHOLESTEROL RATIO: 3.7 %
HDLC SERPL-MCNC: 43 MG/DL
HGB BLD-MCNC: 10.9 G/DL (ref 12–16)
HGB UR QL STRIP: NEGATIVE
HYALINE CASTS #/AREA URNS LPF: 65 /LPF
KETONES UR QL STRIP: NEGATIVE
LDLC SERPL CALC-MCNC: 127 MG/DL
LEUKOCYTE ESTERASE UR QL STRIP: ABNORMAL
MCH RBC QN AUTO: 28.6 PG (ref 27–31)
MCHC RBC AUTO-ENTMCNC: 31.9 G/DL (ref 32–36)
MCV RBC AUTO: 90 FL (ref 82–98)
METHADONE UR QL SCN>300 NG/ML: NEGATIVE
MICROSCOPIC COMMENT: ABNORMAL
NITRITE UR QL STRIP: NEGATIVE
NON HDL CHOL (CALC): 118
OPIATES UR QL SCN: NEGATIVE
PCP UR QL SCN>25 NG/ML: NEGATIVE
PH UR STRIP: 5 [PH] (ref 5–8)
PLATELET # BLD AUTO: 229 K/UL (ref 150–450)
PMV BLD AUTO: 9.8 FL (ref 9.2–12.9)
POTASSIUM SERPL-SCNC: 4.6 MMOL/L (ref 3.5–5.1)
PROT SERPL-MCNC: 6.7 G/DL (ref 6–8.4)
PROT UR QL STRIP: ABNORMAL
RBC # BLD AUTO: 3.81 M/UL (ref 4–5.4)
RBC #/AREA URNS HPF: 3 /HPF (ref 0–4)
SODIUM SERPL-SCNC: 135 MMOL/L (ref 136–145)
SP GR UR STRIP: >1.03 (ref 1–1.03)
SQUAMOUS #/AREA URNS HPF: 3 /HPF
TOXICOLOGY INFORMATION: ABNORMAL
TRIGL SERPL-MCNC: 76 MG/DL
TROPONIN I SERPL DL<=0.01 NG/ML-MCNC: 0.01 NG/ML (ref 0–0.03)
URN SPEC COLLECT METH UR: ABNORMAL
UROBILINOGEN UR STRIP-ACNC: ABNORMAL EU/DL
VLDL CHOLESTEROL: 15 MG/DL
WBC # BLD AUTO: 6.42 K/UL (ref 3.9–12.7)
WBC #/AREA URNS HPF: 24 /HPF (ref 0–5)
YEAST URNS QL MICRO: ABNORMAL

## 2021-01-01 PROCEDURE — 99999 PR PBB SHADOW E&M-EST. PATIENT-LVL IV: ICD-10-PCS | Mod: PBBFAC,,, | Performed by: FAMILY MEDICINE

## 2021-01-01 PROCEDURE — 99999 PR PBB SHADOW E&M-EST. PATIENT-LVL V: ICD-10-PCS | Mod: PBBFAC,,, | Performed by: FAMILY MEDICINE

## 2021-01-01 PROCEDURE — 80053 COMPREHEN METABOLIC PANEL: CPT | Performed by: EMERGENCY MEDICINE

## 2021-01-01 PROCEDURE — 25000003 PHARM REV CODE 250: Performed by: EMERGENCY MEDICINE

## 2021-01-01 PROCEDURE — 84484 ASSAY OF TROPONIN QUANT: CPT | Performed by: EMERGENCY MEDICINE

## 2021-01-01 PROCEDURE — 87086 URINE CULTURE/COLONY COUNT: CPT | Performed by: EMERGENCY MEDICINE

## 2021-01-01 PROCEDURE — 85027 COMPLETE CBC AUTOMATED: CPT | Performed by: EMERGENCY MEDICINE

## 2021-01-01 PROCEDURE — 93010 ELECTROCARDIOGRAM REPORT: CPT | Mod: ,,, | Performed by: INTERNAL MEDICINE

## 2021-01-01 PROCEDURE — 99214 PR OFFICE/OUTPT VISIT, EST, LEVL IV, 30-39 MIN: ICD-10-PCS | Mod: S$PBB,,, | Performed by: FAMILY MEDICINE

## 2021-01-01 PROCEDURE — G0180 MD CERTIFICATION HHA PATIENT: HCPCS | Mod: ,,, | Performed by: FAMILY MEDICINE

## 2021-01-01 PROCEDURE — 63600175 PHARM REV CODE 636 W HCPCS: Performed by: EMERGENCY MEDICINE

## 2021-01-01 PROCEDURE — 99214 OFFICE O/P EST MOD 30 MIN: CPT | Mod: S$PBB,,, | Performed by: FAMILY MEDICINE

## 2021-01-01 PROCEDURE — 80307 DRUG TEST PRSMV CHEM ANLYZR: CPT | Performed by: EMERGENCY MEDICINE

## 2021-01-01 PROCEDURE — 93005 ELECTROCARDIOGRAM TRACING: CPT

## 2021-01-01 PROCEDURE — 81000 URINALYSIS NONAUTO W/SCOPE: CPT | Mod: 59 | Performed by: EMERGENCY MEDICINE

## 2021-01-01 PROCEDURE — 99999 PR PBB SHADOW E&M-EST. PATIENT-LVL IV: CPT | Mod: PBBFAC,,, | Performed by: FAMILY MEDICINE

## 2021-01-01 PROCEDURE — 99284 EMERGENCY DEPT VISIT MOD MDM: CPT | Mod: 25

## 2021-01-01 PROCEDURE — G0180 PR HOME HEALTH MD CERTIFICATION: ICD-10-PCS | Mod: ,,, | Performed by: FAMILY MEDICINE

## 2021-01-01 PROCEDURE — 99215 OFFICE O/P EST HI 40 MIN: CPT | Mod: PBBFAC,PN | Performed by: FAMILY MEDICINE

## 2021-01-01 PROCEDURE — 93010 EKG 12-LEAD: ICD-10-PCS | Mod: ,,, | Performed by: INTERNAL MEDICINE

## 2021-01-01 PROCEDURE — 96365 THER/PROPH/DIAG IV INF INIT: CPT

## 2021-01-01 PROCEDURE — 99999 PR PBB SHADOW E&M-EST. PATIENT-LVL V: CPT | Mod: PBBFAC,,, | Performed by: FAMILY MEDICINE

## 2021-01-01 PROCEDURE — 83880 ASSAY OF NATRIURETIC PEPTIDE: CPT | Performed by: EMERGENCY MEDICINE

## 2021-01-01 PROCEDURE — 99214 OFFICE O/P EST MOD 30 MIN: CPT | Mod: PBBFAC,PN | Performed by: FAMILY MEDICINE

## 2021-01-01 RX ORDER — CEPHALEXIN 500 MG/1
500 CAPSULE ORAL EVERY 12 HOURS
Qty: 10 CAPSULE | Refills: 0 | Status: SHIPPED | OUTPATIENT
Start: 2021-01-01 | End: 2021-01-01

## 2021-01-01 RX ORDER — DICLOFENAC SODIUM 10 MG/G
2 GEL TOPICAL 2 TIMES DAILY
Qty: 1 TUBE | Refills: 0 | Status: SHIPPED | OUTPATIENT
Start: 2021-01-01 | End: 2022-01-01

## 2021-01-01 RX ORDER — PAROXETINE HYDROCHLORIDE 40 MG/1
40 TABLET, FILM COATED ORAL EVERY MORNING
Status: ON HOLD | COMMUNITY
End: 2022-01-01

## 2021-01-01 RX ORDER — MELOXICAM 7.5 MG/1
7.5 TABLET ORAL DAILY
Status: DISCONTINUED | OUTPATIENT
Start: 2021-01-01 | End: 2021-01-01 | Stop reason: HOSPADM

## 2021-01-01 RX ORDER — CAPSAICIN 0.75 MG/G
CREAM TOPICAL ONCE
Status: COMPLETED | OUTPATIENT
Start: 2021-01-01 | End: 2021-01-01

## 2021-01-01 RX ORDER — MEMANTINE HYDROCHLORIDE 5 MG/1
5 TABLET ORAL 2 TIMES DAILY
Qty: 60 TABLET | Refills: 11 | Status: CANCELLED | OUTPATIENT
Start: 2021-01-01 | End: 2022-11-18

## 2021-01-01 RX ORDER — LIDOCAINE 50 MG/G
1 PATCH TOPICAL DAILY
Qty: 7 PATCH | Refills: 0 | Status: SHIPPED | OUTPATIENT
Start: 2021-01-01 | End: 2021-01-01

## 2021-01-01 RX ORDER — LIDOCAINE 50 MG/G
1 PATCH TOPICAL
Status: DISCONTINUED | OUTPATIENT
Start: 2021-01-01 | End: 2021-01-01 | Stop reason: HOSPADM

## 2021-01-01 RX ADMIN — LIDOCAINE 5% 1 PATCH: 700 PATCH TOPICAL at 12:11

## 2021-01-01 RX ADMIN — CAPSAICIN: 0.75 CREAM TOPICAL at 01:07

## 2021-01-01 RX ADMIN — SODIUM CHLORIDE 250 ML: 0.9 INJECTION, SOLUTION INTRAVENOUS at 01:11

## 2021-01-01 RX ADMIN — CEFTRIAXONE 1 G: 1 INJECTION, SOLUTION INTRAVENOUS at 01:11

## 2021-01-01 RX ADMIN — MELOXICAM 7.5 MG: 7.5 TABLET ORAL at 10:07

## 2021-01-05 ENCOUNTER — PATIENT MESSAGE (OUTPATIENT)
Dept: FAMILY MEDICINE | Facility: CLINIC | Age: 81
End: 2021-01-05

## 2021-01-06 ENCOUNTER — TELEPHONE (OUTPATIENT)
Dept: VASCULAR SURGERY | Facility: CLINIC | Age: 81
End: 2021-01-06

## 2021-01-06 DIAGNOSIS — I71.20 THORACIC AORTIC ANEURYSM, WITHOUT RUPTURE: ICD-10-CM

## 2021-01-08 ENCOUNTER — OFFICE VISIT (OUTPATIENT)
Dept: FAMILY MEDICINE | Facility: CLINIC | Age: 81
End: 2021-01-08
Payer: MEDICARE

## 2021-01-08 ENCOUNTER — PATIENT MESSAGE (OUTPATIENT)
Dept: FAMILY MEDICINE | Facility: CLINIC | Age: 81
End: 2021-01-08

## 2021-01-08 VITALS
WEIGHT: 139.69 LBS | TEMPERATURE: 97 F | BODY MASS INDEX: 24.75 KG/M2 | RESPIRATION RATE: 18 BRPM | SYSTOLIC BLOOD PRESSURE: 118 MMHG | OXYGEN SATURATION: 95 % | HEIGHT: 63 IN | HEART RATE: 86 BPM | DIASTOLIC BLOOD PRESSURE: 70 MMHG

## 2021-01-08 DIAGNOSIS — R21 RASH: ICD-10-CM

## 2021-01-08 DIAGNOSIS — I71.20 THORACIC AORTIC ANEURYSM, WITHOUT RUPTURE: Primary | ICD-10-CM

## 2021-01-08 DIAGNOSIS — R30.0 DYSURIA: Primary | ICD-10-CM

## 2021-01-08 PROCEDURE — 99213 PR OFFICE/OUTPT VISIT, EST, LEVL III, 20-29 MIN: ICD-10-PCS | Mod: S$PBB,,, | Performed by: FAMILY MEDICINE

## 2021-01-08 PROCEDURE — 99213 OFFICE O/P EST LOW 20 MIN: CPT | Mod: S$PBB,,, | Performed by: FAMILY MEDICINE

## 2021-01-08 PROCEDURE — 99999 PR PBB SHADOW E&M-EST. PATIENT-LVL IV: ICD-10-PCS | Mod: PBBFAC,,, | Performed by: FAMILY MEDICINE

## 2021-01-08 PROCEDURE — 99214 OFFICE O/P EST MOD 30 MIN: CPT | Mod: PBBFAC,PN | Performed by: FAMILY MEDICINE

## 2021-01-08 PROCEDURE — 99999 PR PBB SHADOW E&M-EST. PATIENT-LVL IV: CPT | Mod: PBBFAC,,, | Performed by: FAMILY MEDICINE

## 2021-01-08 RX ORDER — FLUOCINONIDE 0.5 MG/G
CREAM TOPICAL 2 TIMES DAILY PRN
Qty: 180 G | Refills: 0 | Status: SHIPPED | OUTPATIENT
Start: 2021-01-08 | End: 2021-04-30 | Stop reason: SDUPTHER

## 2021-01-08 RX ORDER — FLUOCINONIDE 0.5 MG/G
CREAM TOPICAL 2 TIMES DAILY PRN
Qty: 180 G | Refills: 0 | Status: CANCELLED | OUTPATIENT
Start: 2021-01-08

## 2021-01-08 RX ORDER — NITROFURANTOIN 25; 75 MG/1; MG/1
100 CAPSULE ORAL 2 TIMES DAILY
Qty: 14 CAPSULE | Refills: 0 | Status: SHIPPED | OUTPATIENT
Start: 2021-01-08 | End: 2021-01-01

## 2021-01-12 ENCOUNTER — OFFICE VISIT (OUTPATIENT)
Dept: VASCULAR SURGERY | Facility: CLINIC | Age: 81
End: 2021-01-12
Payer: MEDICARE

## 2021-01-12 VITALS
HEART RATE: 92 BPM | WEIGHT: 137.38 LBS | SYSTOLIC BLOOD PRESSURE: 113 MMHG | HEIGHT: 63 IN | TEMPERATURE: 98 F | BODY MASS INDEX: 24.34 KG/M2 | DIASTOLIC BLOOD PRESSURE: 83 MMHG

## 2021-01-12 PROCEDURE — 99999 PR PBB SHADOW E&M-EST. PATIENT-LVL IV: ICD-10-PCS | Mod: PBBFAC,,, | Performed by: SURGERY

## 2021-01-12 PROCEDURE — 99214 OFFICE O/P EST MOD 30 MIN: CPT | Mod: PBBFAC | Performed by: SURGERY

## 2021-01-12 PROCEDURE — 99214 OFFICE O/P EST MOD 30 MIN: CPT | Mod: S$PBB,,, | Performed by: SURGERY

## 2021-01-12 PROCEDURE — 99214 PR OFFICE/OUTPT VISIT, EST, LEVL IV, 30-39 MIN: ICD-10-PCS | Mod: S$PBB,,, | Performed by: SURGERY

## 2021-01-12 PROCEDURE — 99999 PR PBB SHADOW E&M-EST. PATIENT-LVL IV: CPT | Mod: PBBFAC,,, | Performed by: SURGERY

## 2021-04-13 PROBLEM — N17.9 AKI (ACUTE KIDNEY INJURY): Status: RESOLVED | Noted: 2020-11-25 | Resolved: 2021-04-13

## 2021-04-13 PROBLEM — N18.31 STAGE 3A CHRONIC KIDNEY DISEASE: Status: ACTIVE | Noted: 2021-04-13

## 2021-04-13 PROBLEM — I70.0 AORTIC ATHEROSCLEROSIS: Status: ACTIVE | Noted: 2021-04-13

## 2021-04-13 PROBLEM — U07.1 COVID-19 VIRUS INFECTION: Status: RESOLVED | Noted: 2020-11-25 | Resolved: 2021-04-13

## 2021-04-28 ENCOUNTER — PATIENT MESSAGE (OUTPATIENT)
Dept: FAMILY MEDICINE | Facility: CLINIC | Age: 81
End: 2021-04-28

## 2021-04-28 ENCOUNTER — TELEPHONE (OUTPATIENT)
Dept: FAMILY MEDICINE | Facility: CLINIC | Age: 81
End: 2021-04-28

## 2021-04-28 DIAGNOSIS — J67.9: ICD-10-CM

## 2021-04-28 DIAGNOSIS — R21 RASH: ICD-10-CM

## 2021-04-28 DIAGNOSIS — K21.9 GASTROESOPHAGEAL REFLUX DISEASE WITHOUT ESOPHAGITIS: ICD-10-CM

## 2021-04-28 DIAGNOSIS — E78.00 PURE HYPERCHOLESTEROLEMIA: ICD-10-CM

## 2021-04-30 RX ORDER — FLUTICASONE PROPIONATE 50 MCG
2 SPRAY, SUSPENSION (ML) NASAL DAILY
Qty: 16 G | Refills: 3 | Status: SHIPPED | OUTPATIENT
Start: 2021-04-30 | End: 2022-01-01 | Stop reason: SDUPTHER

## 2021-04-30 RX ORDER — FLUOCINONIDE 0.5 MG/G
CREAM TOPICAL 2 TIMES DAILY PRN
Qty: 180 G | Refills: 0 | Status: SHIPPED | OUTPATIENT
Start: 2021-04-30 | End: 2022-01-01

## 2021-05-03 RX ORDER — ATORVASTATIN CALCIUM 20 MG/1
20 TABLET, FILM COATED ORAL DAILY
Qty: 90 TABLET | Refills: 3 | Status: SHIPPED | OUTPATIENT
Start: 2021-05-03 | End: 2022-01-01

## 2021-05-03 RX ORDER — OMEPRAZOLE 20 MG/1
20 CAPSULE, DELAYED RELEASE ORAL DAILY
Qty: 90 CAPSULE | Refills: 3 | Status: SHIPPED | OUTPATIENT
Start: 2021-05-03 | End: 2021-01-01

## 2021-10-07 PROBLEM — N18.30 BENIGN HYPERTENSION WITH CHRONIC KIDNEY DISEASE, STAGE III: Status: ACTIVE | Noted: 2021-01-01

## 2021-10-07 PROBLEM — I12.9 BENIGN HYPERTENSION WITH CHRONIC KIDNEY DISEASE, STAGE III: Status: ACTIVE | Noted: 2021-01-01

## 2021-10-08 PROBLEM — R26.81 UNSTABLE GAIT: Status: ACTIVE | Noted: 2021-01-01

## 2021-10-08 PROBLEM — R10.84 GENERALIZED ABDOMINAL PAIN: Status: ACTIVE | Noted: 2021-01-01

## 2021-10-08 PROBLEM — T82.330A LEAKAGE OF AORTIC GRAFT: Status: ACTIVE | Noted: 2021-01-01

## 2021-10-08 PROBLEM — K59.04 CHRONIC IDIOPATHIC CONSTIPATION: Status: ACTIVE | Noted: 2021-01-01

## 2021-10-08 PROBLEM — F33.1 MODERATE EPISODE OF RECURRENT MAJOR DEPRESSIVE DISORDER: Status: ACTIVE | Noted: 2021-01-01

## 2021-11-17 PROBLEM — R10.84 GENERALIZED ABDOMINAL PAIN: Status: RESOLVED | Noted: 2021-01-01 | Resolved: 2021-01-01

## 2021-11-18 PROBLEM — G30.1 LATE ONSET ALZHEIMER'S DEMENTIA WITHOUT BEHAVIORAL DISTURBANCE: Status: ACTIVE | Noted: 2021-01-01

## 2021-11-18 PROBLEM — F02.80 LATE ONSET ALZHEIMER'S DEMENTIA WITHOUT BEHAVIORAL DISTURBANCE: Status: ACTIVE | Noted: 2021-01-01

## 2022-01-01 ENCOUNTER — PATIENT MESSAGE (OUTPATIENT)
Dept: FAMILY MEDICINE | Facility: CLINIC | Age: 82
End: 2022-01-01
Payer: MEDICARE

## 2022-01-01 ENCOUNTER — PATIENT MESSAGE (OUTPATIENT)
Dept: ADMINISTRATIVE | Facility: OTHER | Age: 82
End: 2022-01-01
Payer: MEDICARE

## 2022-01-01 ENCOUNTER — TELEPHONE (OUTPATIENT)
Dept: NEUROLOGY | Facility: CLINIC | Age: 82
End: 2022-01-01
Payer: MEDICARE

## 2022-01-01 ENCOUNTER — OFFICE VISIT (OUTPATIENT)
Dept: FAMILY MEDICINE | Facility: CLINIC | Age: 82
End: 2022-01-01
Payer: MEDICARE

## 2022-01-01 VITALS
HEART RATE: 69 BPM | OXYGEN SATURATION: 95 % | DIASTOLIC BLOOD PRESSURE: 60 MMHG | HEIGHT: 63 IN | BODY MASS INDEX: 20.45 KG/M2 | SYSTOLIC BLOOD PRESSURE: 110 MMHG | RESPIRATION RATE: 18 BRPM | WEIGHT: 115.38 LBS

## 2022-01-01 DIAGNOSIS — Z86.73 HISTORY OF TIA (TRANSIENT ISCHEMIC ATTACK): ICD-10-CM

## 2022-01-01 DIAGNOSIS — E78.2 MIXED HYPERLIPIDEMIA: ICD-10-CM

## 2022-01-01 DIAGNOSIS — G30.1 LATE ONSET ALZHEIMER'S DEMENTIA WITHOUT BEHAVIORAL DISTURBANCE: ICD-10-CM

## 2022-01-01 DIAGNOSIS — Z86.79 S/P AAA (ABDOMINAL AORTIC ANEURYSM) REPAIR: ICD-10-CM

## 2022-01-01 DIAGNOSIS — I34.0 NONRHEUMATIC MITRAL VALVE REGURGITATION: ICD-10-CM

## 2022-01-01 DIAGNOSIS — F02.80 LATE ONSET ALZHEIMER'S DEMENTIA WITHOUT BEHAVIORAL DISTURBANCE: Primary | ICD-10-CM

## 2022-01-01 DIAGNOSIS — G30.1 LATE ONSET ALZHEIMER'S DEMENTIA WITHOUT BEHAVIORAL DISTURBANCE: Primary | ICD-10-CM

## 2022-01-01 DIAGNOSIS — N18.31 STAGE 3A CHRONIC KIDNEY DISEASE: ICD-10-CM

## 2022-01-01 DIAGNOSIS — F33.1 MODERATE EPISODE OF RECURRENT MAJOR DEPRESSIVE DISORDER: ICD-10-CM

## 2022-01-01 DIAGNOSIS — J40 BRONCHITIS: ICD-10-CM

## 2022-01-01 DIAGNOSIS — I70.0 AORTIC ATHEROSCLEROSIS: ICD-10-CM

## 2022-01-01 DIAGNOSIS — I10 ESSENTIAL HYPERTENSION: Primary | ICD-10-CM

## 2022-01-01 DIAGNOSIS — F02.80 LATE ONSET ALZHEIMER'S DEMENTIA WITHOUT BEHAVIORAL DISTURBANCE: ICD-10-CM

## 2022-01-01 DIAGNOSIS — N30.00 ACUTE CYSTITIS WITHOUT HEMATURIA: Primary | ICD-10-CM

## 2022-01-01 DIAGNOSIS — I48.0 PAROXYSMAL ATRIAL FIBRILLATION: ICD-10-CM

## 2022-01-01 DIAGNOSIS — Z79.01 CURRENT USE OF LONG TERM ANTICOAGULATION: ICD-10-CM

## 2022-01-01 DIAGNOSIS — J67.9: ICD-10-CM

## 2022-01-01 DIAGNOSIS — T82.330A: ICD-10-CM

## 2022-01-01 DIAGNOSIS — F41.1 GENERALIZED ANXIETY DISORDER: ICD-10-CM

## 2022-01-01 DIAGNOSIS — I50.32 CHRONIC DIASTOLIC HEART FAILURE: ICD-10-CM

## 2022-01-01 DIAGNOSIS — Z98.890 S/P AAA (ABDOMINAL AORTIC ANEURYSM) REPAIR: ICD-10-CM

## 2022-01-01 PROCEDURE — 99999 PR PBB SHADOW E&M-EST. PATIENT-LVL IV: ICD-10-PCS | Mod: PBBFAC,,, | Performed by: FAMILY MEDICINE

## 2022-01-01 PROCEDURE — 94640 AIRWAY INHALATION TREATMENT: CPT | Mod: PBBFAC,59,PN

## 2022-01-01 PROCEDURE — 99999 PR PBB SHADOW E&M-EST. PATIENT-LVL IV: CPT | Mod: PBBFAC,,, | Performed by: FAMILY MEDICINE

## 2022-01-01 PROCEDURE — 99214 OFFICE O/P EST MOD 30 MIN: CPT | Mod: PBBFAC,PN,25 | Performed by: FAMILY MEDICINE

## 2022-01-01 PROCEDURE — 99214 PR OFFICE/OUTPT VISIT, EST, LEVL IV, 30-39 MIN: ICD-10-PCS | Mod: S$PBB,,, | Performed by: FAMILY MEDICINE

## 2022-01-01 PROCEDURE — G0008 ADMIN INFLUENZA VIRUS VAC: HCPCS | Mod: PBBFAC,PN

## 2022-01-01 PROCEDURE — 99214 OFFICE O/P EST MOD 30 MIN: CPT | Mod: S$PBB,,, | Performed by: FAMILY MEDICINE

## 2022-01-01 PROCEDURE — 90694 VACC AIIV4 NO PRSRV 0.5ML IM: CPT | Mod: PBBFAC,PN

## 2022-01-01 RX ORDER — ALBUTEROL SULFATE 0.83 MG/ML
2.5 SOLUTION RESPIRATORY (INHALATION)
Status: COMPLETED | OUTPATIENT
Start: 2022-01-01 | End: 2022-01-01

## 2022-01-01 RX ORDER — BUPROPION HYDROCHLORIDE 75 MG/1
75 TABLET ORAL DAILY
Status: ON HOLD | COMMUNITY
End: 2022-01-01

## 2022-01-01 RX ORDER — CEPHALEXIN 500 MG/1
500 CAPSULE ORAL EVERY 12 HOURS
Qty: 10 CAPSULE | Refills: 0 | Status: SHIPPED | OUTPATIENT
Start: 2022-01-01 | End: 2022-01-01

## 2022-01-01 RX ORDER — FLUTICASONE PROPIONATE 50 MCG
2 SPRAY, SUSPENSION (ML) NASAL DAILY
Qty: 16 G | Refills: 3 | Status: SHIPPED | OUTPATIENT
Start: 2022-01-01 | End: 2022-01-01

## 2022-01-01 RX ORDER — OMEPRAZOLE 10 MG/1
20 CAPSULE, DELAYED RELEASE ORAL DAILY
Qty: 90 CAPSULE | Refills: 1 | Status: ON HOLD | OUTPATIENT
Start: 2022-01-01 | End: 2022-01-01

## 2022-01-01 RX ORDER — IPRATROPIUM BROMIDE AND ALBUTEROL SULFATE 2.5; .5 MG/3ML; MG/3ML
3 SOLUTION RESPIRATORY (INHALATION)
Status: DISCONTINUED | OUTPATIENT
Start: 2022-01-01 | End: 2022-01-01

## 2022-01-01 RX ORDER — OMEPRAZOLE 10 MG/1
10 CAPSULE, DELAYED RELEASE ORAL DAILY
COMMUNITY
End: 2022-01-01 | Stop reason: SDUPTHER

## 2022-01-01 RX ORDER — OMEPRAZOLE 10 MG/1
10 CAPSULE, DELAYED RELEASE ORAL DAILY
Qty: 90 CAPSULE | Refills: 1 | Status: SHIPPED | OUTPATIENT
Start: 2022-01-01 | End: 2022-01-01

## 2022-01-01 RX ADMIN — ALBUTEROL SULFATE 2.5 MG: 2.5 SOLUTION RESPIRATORY (INHALATION) at 02:01

## 2022-01-19 PROBLEM — J40 BRONCHITIS: Status: ACTIVE | Noted: 2022-01-01

## 2022-01-19 NOTE — PATIENT INSTRUCTIONS
Covid-19 vaccination scheduling  1. You can schedule on your MyChart  a. Go to Visits  b. Schedule visit  c. Scroll to the bottom and select Covid-19 vaccine/booster  d. Answer questions  e. Select location   2. You can also call [phone]  3. The Moderna vaccine is available at any [facility] pharmacy for walk in  4. Local pharmacies also have the booster available
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
## 2022-05-16 NOTE — TELEPHONE ENCOUNTER
----- Message from Diana Cotto sent at 5/16/2022  8:39 AM CDT -----  Type:  Sooner Apoointment Request    Caller is requesting a sooner appointment.  Caller declined first available appointment listed below.  Caller will not accept being placed on the waitlist and is requesting a message be sent to doctor.  Name of Caller: Emelina   When is the first available appointment? None   Symptoms: dementia   Would the patient rather a call back or a response via [facility]? Call back   Best Call Back Number: [phone]  cell...   Additional Information:  Daughter Emelina is calling to schedule a NP appointment for her mother with dementia ... would like to be seen anytime beginning on Malini first.... Monday -Thursday any day or time besides  June 09-12 and 27-30     Just want a diagnosis and to find out how to handle pt with Dementia

## 2022-05-18 NOTE — TELEPHONE ENCOUNTER
No new care gaps identified.  [facility] Embedded Care Gaps. Reference number: [id number]. 5/18/2022   12:44:24 PM CDT

## 2022-06-15 NOTE — TELEPHONE ENCOUNTER
No new care gaps identified.  [facility] Embedded Care Gaps. Reference number: [id number]. 6/15/2022   9:09:58 AM OPALT

## 2022-06-28 PROBLEM — R62.7 FAILURE TO THRIVE IN ADULT: Status: ACTIVE | Noted: 2022-01-01

## 2022-06-28 PROBLEM — F32.9 MAJOR DEPRESSIVE DISORDER WITH CURRENT ACTIVE EPISODE: Status: ACTIVE | Noted: 2022-01-01

## 2022-06-28 PROBLEM — R79.89 ELEVATED LFTS: Status: ACTIVE | Noted: 2022-01-01

## 2022-06-28 PROBLEM — I48.91 ATRIAL FIBRILLATION WITH RVR: Status: ACTIVE | Noted: 2020-06-23

## 2022-06-29 PROBLEM — I95.1 ORTHOSTATIC HYPOTENSION: Status: ACTIVE | Noted: 2022-01-01

## 2022-06-30 PROBLEM — R00.1 BRADYCARDIA, DRUG INDUCED: Status: ACTIVE | Noted: 2022-01-01

## 2022-06-30 PROBLEM — I50.43 ACUTE ON CHRONIC COMBINED SYSTOLIC AND DIASTOLIC HEART FAILURE: Status: ACTIVE | Noted: 2020-06-23

## 2022-06-30 PROBLEM — Z71.89 GOALS OF CARE, COUNSELING/DISCUSSION: Status: ACTIVE | Noted: 2022-01-01

## 2022-06-30 PROBLEM — E63.9 INADEQUATE DIETARY ENERGY INTAKE: Status: ACTIVE | Noted: 2022-01-01

## 2022-06-30 PROBLEM — T50.905A BRADYCARDIA, DRUG INDUCED: Status: ACTIVE | Noted: 2022-01-01

## 2022-06-30 PROBLEM — I46.2 BRADYCARDIC CARDIAC ARREST: Status: ACTIVE | Noted: 2022-01-01

## 2022-06-30 PROBLEM — I42.9 CARDIOMYOPATHY: Status: ACTIVE | Noted: 2022-01-01

## 2022-07-01 PROBLEM — D72.829 LEUCOCYTOSIS: Status: ACTIVE | Noted: 2022-01-01

## 2022-07-01 PROBLEM — E87.6 HYPOKALEMIA: Status: ACTIVE | Noted: 2022-01-01

## 2022-07-03 PROBLEM — E83.42 HYPOMAGNESEMIA: Status: ACTIVE | Noted: 2022-01-01

## 2022-07-03 PROBLEM — E87.1 HYPONATREMIA: Status: ACTIVE | Noted: 2022-01-01

## 2022-07-06 PROBLEM — I95.1 ORTHOSTATIC HYPOTENSION: Status: RESOLVED | Noted: 2022-01-01 | Resolved: 2022-01-01

## 2022-07-06 PROBLEM — F32.9 MAJOR DEPRESSIVE DISORDER WITH CURRENT ACTIVE EPISODE: Status: RESOLVED | Noted: 2022-01-01 | Resolved: 2022-01-01

## 2022-07-06 PROBLEM — R79.89 ELEVATED LFTS: Status: RESOLVED | Noted: 2022-01-01 | Resolved: 2022-01-01

## 2022-07-07 PROBLEM — Z51.5 HOSPICE CARE PATIENT: Status: ACTIVE | Noted: 2022-01-01
